# Patient Record
Sex: MALE | Race: WHITE | Employment: OTHER | URBAN - METROPOLITAN AREA
[De-identification: names, ages, dates, MRNs, and addresses within clinical notes are randomized per-mention and may not be internally consistent; named-entity substitution may affect disease eponyms.]

---

## 2020-08-28 VITALS
BODY MASS INDEX: 31.83 KG/M2 | RESPIRATION RATE: 18 BRPM | DIASTOLIC BLOOD PRESSURE: 88 MMHG | HEIGHT: 74 IN | WEIGHT: 248 LBS | SYSTOLIC BLOOD PRESSURE: 128 MMHG

## 2020-08-28 PROBLEM — R05.3 CHRONIC COUGH: Status: ACTIVE | Noted: 2017-07-07

## 2020-08-28 RX ORDER — WARFARIN SODIUM 5 MG/1
5 TABLET ORAL DAILY
COMMUNITY
End: 2021-02-01

## 2020-08-28 RX ORDER — MELOXICAM 15 MG/1
15 TABLET ORAL DAILY
COMMUNITY
End: 2020-12-26

## 2020-08-28 RX ORDER — HYDROCODONE BITARTRATE AND ACETAMINOPHEN 5; 325 MG/1; MG/1
TABLET ORAL
COMMUNITY
End: 2021-04-08 | Stop reason: ALTCHOICE

## 2020-08-28 RX ORDER — WARFARIN 7.5 MG/1
7.5 TABLET ORAL DAILY
COMMUNITY
End: 2020-09-30

## 2020-08-28 RX ORDER — PANTOPRAZOLE SODIUM 40 MG/1
40 TABLET, DELAYED RELEASE ORAL DAILY
COMMUNITY
End: 2021-07-20

## 2020-09-18 ENCOUNTER — TELEPHONE (OUTPATIENT)
Dept: INTERNAL MEDICINE CLINIC | Age: 65
End: 2020-09-18

## 2020-09-18 NOTE — TELEPHONE ENCOUNTER
Patient asks if you are still reviewing his pt/inr results. He says he has not heard from you in a while and wants to know if he should continue having them drawn.   Please call him at [phone]

## 2020-09-30 ENCOUNTER — HOSPITAL ENCOUNTER (OUTPATIENT)
Dept: LAB | Age: 65
Discharge: HOME | End: 2020-09-30
Payer: MEDICARE

## 2020-09-30 LAB
INR PPP: 2.2
PROTHROMBIN TIME: 19.2 SEC (ref 11.5–13.9)

## 2020-09-30 PROCEDURE — 85610 PROTHROMBIN TIME: CPT

## 2020-09-30 PROCEDURE — 36415 COLL VENOUS BLD VENIPUNCTURE: CPT

## 2020-09-30 RX ORDER — WARFARIN 7.5 MG/1
TABLET ORAL
Qty: 90 TAB | Refills: 0 | Status: SHIPPED | OUTPATIENT
Start: 2020-09-30 | End: 2021-02-01

## 2020-10-05 ENCOUNTER — HOSPITAL ENCOUNTER (OUTPATIENT)
Dept: LAB | Age: 65
Discharge: HOME | End: 2020-10-05
Payer: MEDICARE

## 2020-10-05 ENCOUNTER — OFFICE VISIT (OUTPATIENT)
Dept: INTERNAL MEDICINE CLINIC | Age: 65
End: 2020-10-05
Payer: MEDICARE

## 2020-10-05 ENCOUNTER — TRANSCRIBE ORDER (OUTPATIENT)
Dept: REGISTRATION | Age: 65
End: 2020-10-05

## 2020-10-05 DIAGNOSIS — E78.5 HYPERLIPIDEMIA, UNSPECIFIED HYPERLIPIDEMIA TYPE: ICD-10-CM

## 2020-10-05 DIAGNOSIS — I10 ESSENTIAL HYPERTENSION: ICD-10-CM

## 2020-10-05 DIAGNOSIS — Z13.6 SCREENING FOR ISCHEMIC HEART DISEASE: ICD-10-CM

## 2020-10-05 DIAGNOSIS — Z12.11 ENCOUNTER FOR SCREENING COLONOSCOPY: ICD-10-CM

## 2020-10-05 DIAGNOSIS — Z13.39 SCREENING FOR ALCOHOLISM: ICD-10-CM

## 2020-10-05 DIAGNOSIS — Z12.11 SCREEN FOR COLON CANCER: ICD-10-CM

## 2020-10-05 DIAGNOSIS — E78.5 HYPERLIPEMIA: ICD-10-CM

## 2020-10-05 DIAGNOSIS — I10 ESSENTIAL HYPERTENSION, MALIGNANT: ICD-10-CM

## 2020-10-05 DIAGNOSIS — I10 ESSENTIAL HYPERTENSION, MALIGNANT: Primary | ICD-10-CM

## 2020-10-05 DIAGNOSIS — Z13.1 SCREENING FOR DIABETES MELLITUS: ICD-10-CM

## 2020-10-05 DIAGNOSIS — Z13.31 SCREENING FOR DEPRESSION: ICD-10-CM

## 2020-10-05 DIAGNOSIS — Z00.00 WELCOME TO MEDICARE PREVENTIVE VISIT: Primary | ICD-10-CM

## 2020-10-05 LAB
ALBUMIN SERPL-MCNC: 4.3 G/DL (ref 3.5–4.7)
ALBUMIN/GLOB SERPL: 1.6 {RATIO}
ALP SERPL-CCNC: 64 U/L (ref 38–126)
ALT SERPL-CCNC: 30 U/L (ref 3–72)
ANION GAP SERPL CALC-SCNC: 9 MMOL/L
AST SERPL W P-5'-P-CCNC: 29 U/L (ref 17–74)
BILIRUB SERPL-MCNC: 0.3 MG/DL (ref 0.2–1)
BUN SERPL-MCNC: 17 MG/DL (ref 9–21)
BUN/CREAT SERPL: 17
CA-I BLD-MCNC: 9.2 MG/DL (ref 8.5–10.5)
CHLORIDE SERPL-SCNC: 104 MMOL/L (ref 94–111)
CHOLEST SERPL-MCNC: 202 MG/DL
CO2 SERPL-SCNC: 25 MMOL/L (ref 21–33)
CREAT SERPL-MCNC: 1 MG/DL (ref 0.8–1.5)
GLOBULIN SER CALC-MCNC: 2.7 G/DL
GLUCOSE SERPL-MCNC: 89 MG/DL (ref 70–110)
HDLC SERPL-MCNC: 41 MG/DL
HDLC SERPL: 4.9 {RATIO} (ref 0–5)
LDLC SERPL CALC-MCNC: 141.4 MG/DL (ref 0–100)
LIPID PROFILE,FLP: ABNORMAL
POTASSIUM SERPL-SCNC: 4 MMOL/L (ref 3.2–5.1)
PROT SERPL-MCNC: 7 G/DL (ref 6.1–8.4)
SODIUM SERPL-SCNC: 138 MMOL/L (ref 135–145)
TRIGL SERPL-MCNC: 98 MG/DL (ref ?–150)
VLDLC SERPL CALC-MCNC: 19.6 MG/DL

## 2020-10-05 PROCEDURE — 93000 ELECTROCARDIOGRAM COMPLETE: CPT | Performed by: INTERNAL MEDICINE

## 2020-10-05 PROCEDURE — G8417 CALC BMI ABV UP PARAM F/U: HCPCS | Performed by: INTERNAL MEDICINE

## 2020-10-05 PROCEDURE — G8427 DOCREV CUR MEDS BY ELIG CLIN: HCPCS | Performed by: INTERNAL MEDICINE

## 2020-10-05 PROCEDURE — G0402 INITIAL PREVENTIVE EXAM: HCPCS | Performed by: INTERNAL MEDICINE

## 2020-10-05 PROCEDURE — 80061 LIPID PANEL: CPT

## 2020-10-05 PROCEDURE — G8536 NO DOC ELDER MAL SCRN: HCPCS | Performed by: INTERNAL MEDICINE

## 2020-10-05 PROCEDURE — 36415 COLL VENOUS BLD VENIPUNCTURE: CPT

## 2020-10-05 PROCEDURE — 80053 COMPREHEN METABOLIC PANEL: CPT

## 2020-10-05 PROCEDURE — G8510 SCR DEP NEG, NO PLAN REQD: HCPCS | Performed by: INTERNAL MEDICINE

## 2020-10-05 RX ORDER — METOPROLOL SUCCINATE 50 MG/1
50 TABLET, EXTENDED RELEASE ORAL DAILY
Qty: 30 TAB | Refills: 5 | Status: SHIPPED | OUTPATIENT
Start: 2020-10-05 | End: 2020-10-26 | Stop reason: DRUGHIGH

## 2020-10-05 RX ORDER — LANOLIN ALCOHOL/MO/W.PET/CERES
1000 CREAM (GRAM) TOPICAL DAILY
COMMUNITY
End: 2021-11-02

## 2020-10-05 RX ORDER — LOSARTAN POTASSIUM AND HYDROCHLOROTHIAZIDE 12.5; 1 MG/1; MG/1
1 TABLET ORAL DAILY
Qty: 90 TAB | Refills: 3 | Status: SHIPPED | OUTPATIENT
Start: 2020-10-05 | End: 2021-11-02

## 2020-10-05 RX ORDER — ASPIRIN 81 MG/1
81 TABLET ORAL DAILY
COMMUNITY
End: 2022-11-02 | Stop reason: CLARIF

## 2020-10-05 NOTE — PROGRESS NOTES
Kathryn Dean. presents today for   Chief Complaint   Patient presents with    Annual Wellness Visit       Is someone accompanying this pt? no    Is the patient using any DME equipment during OV? no    Depression Screening:  3 most recent PHQ Screens 10/5/2020   Little interest or pleasure in doing things Not at all   Feeling down, depressed, irritable, or hopeless Not at all   Total Score PHQ 2 0       Learning Assessment:  Learning Assessment 10/5/2020   PRIMARY LEARNER Patient   HIGHEST LEVEL OF EDUCATION - PRIMARY LEARNER  GRADUATED HIGH SCHOOL OR GED   BARRIERS PRIMARY LEARNER NONE   CO-LEARNER CAREGIVER No   PRIMARY LANGUAGE ENGLISH   LEARNER PREFERENCE PRIMARY DEMONSTRATION   ANSWERED BY patient   RELATIONSHIP SELF       Abuse Screening:  Abuse Screening Questionnaire 10/5/2020   Do you ever feel afraid of your partner? N   Are you in a relationship with someone who physically or mentally threatens you? N   Is it safe for you to go home? Y       Fall Risk  Fall Risk Assessment, last 12 mths 10/5/2020   Able to walk? Yes   Fall in past 12 months? No       ADL  ADL Assessment 10/5/2020   Feeding yourself No Help Needed   Getting from bed to chair No Help Needed   Getting dressed No Help Needed   Bathing or showering No Help Needed   Walk across the room (includes cane/walker) No Help Needed   Using the telphone No Help Needed   Taking your medications No Help Needed   Preparing meals No Help Needed   Managing money (expenses/bills) No Help Needed   Moderately strenuous housework (laundry) No Help Needed   Shopping for personal items (toiletries/medicines) No Help Needed   Shopping for groceries No Help Needed   Driving No Help Needed   Climbing a flight of stairs No Help Needed   Getting to places beyond walking distances No Help Needed       Health Maintenance reviewed and discussed and ordered per Provider.     Health Maintenance Due   Topic Date Due    Hepatitis C Screening  1955    DTaP/Tdap/Td series (1 - Tdap) 08/27/1976    Lipid Screen  08/27/1995    Shingrix Vaccine Age 50> (1 of 2) 08/27/2005    FOBT Q1Y Age 54-65  08/27/2005    GLAUCOMA SCREENING Q2Y  08/27/2020    AAA Screening 73-69 YO Male Smoking Patients  08/27/2020    Pneumococcal 65+ years (1 of 1 - PPSV23) 08/27/2020    Flu Vaccine (1) 09/01/2020    Medicare Yearly Exam  09/30/2020   . Coordination of Care:  1. Have you been to the ER, urgent care clinic since your last visit? Hospitalized since your last visit? no    2. Have you seen or consulted any other health care providers outside of the [address] [place] [address] since your last visit? Include any pap smears or colon screening. no                    This is the Subsequent Medicare Annual Wellness Exam, performed 12 months or more after the Initial AWV or the last Subsequent AWV    I have reviewed the patient's medical history in detail and updated the computerized patient record. History     Patient Active Problem List   Diagnosis Code    Chronic cough R05    Deep venous thrombosis (HCC) I82.409    Essential hypertension I10    Increased frequency of urination R35.0    Primary hemochromatosis ([facility] [address] E83.110     Past Medical History:   Diagnosis Date    Arrhythmia     Hypertension       Past Surgical History:   Procedure Laterality Date    HX ORTHOPAEDIC      RIGHT ELBOW, RIGHT ROTATOR CUFF, RIGHT ARM, BILATERAL KNEE REPLACEMENT, 2 BACK SURGERIES    HX OTHER SURGICAL      SINUS X4     Current Outpatient Medications   Medication Sig Dispense Refill    aspirin delayed-release 81 mg tablet Take 81 mg by mouth daily.  loratadine/pseudoephedrine (CLARITIN-D 12 HOUR PO) Take  by mouth daily.  cyanocobalamin 1,000 mcg tablet Take 1,000 mcg by mouth daily.  warfarin (COUMADIN) 7.5 mg tablet TAKE 1 TABLET BY MOUTH DAILY 90 Tab 0    meloxicam (MOBIC) 15 mg tablet Take 15 mg by mouth daily.       pantoprazole (PROTONIX) 40 mg tablet Take 40 mg by mouth daily.  warfarin (COUMADIN) 5 mg tablet Take 5 mg by mouth daily.  hydroCHLOROthiazide (MICROZIDE) 12.5 mg capsule TAKE 1 CAPSULE BY MOUTH DAILY 90 Cap 1    losartan (COZAAR) 100 mg tablet TAKE 1 TABLET BY MOUTH DAILY 90 Tab 1    HYDROcodone-acetaminophen (NORCO) 5-325 mg per tablet Take  by mouth. No Known Allergies    No family history on file. Social History     Tobacco Use    Smoking status: Former Smoker     Packs/day: 0.50     Years: 20.00     Pack years: 10.00    Smokeless tobacco: Never Used   Substance Use Topics    Alcohol use: Not on file       Depression Risk Factor Screening:     3 most recent PHQ Screens 10/5/2020   Little interest or pleasure in doing things Not at all   Feeling down, depressed, irritable, or hopeless Not at all   Total Score PHQ 2 0       Alcohol Risk Screen   Do you average more than 1 drink per night or more than 7 drinks a week: no    In the past three months have you have had more than 4 drinks containing alcohol on one occasion: no        Functional Ability and Level of Safety:   Hearing hearing is good     Activities of Daily Living: The home contains: no safety equipment  Patient does total self care      Ambulation: with no difficulty      Fall Risk:  Fall Risk Assessment, last 12 mths 10/5/2020   Able to walk? Yes   Fall in past 12 months?  No     Abuse Screen:  Patient is not abused     Cognitive Screening   Has your family/caregiver stated any concerns about your memory: no     Normal     Patient Care Team   Patient Care Team:  David Peterson MD as PCP - General (Internal Medicine)  David Peterson MD as PCP - [facility] Empaneled Provider    Assessment/Plan   Education and counseling provided:  Appropriated based on age     Annual Medicare [address] Maintenance Due   Topic Date Due    Hepatitis C Screening  1955    DTaP/Tdap/Td series (1 - Tdap) 08/27/1976    Lipid Screen  08/27/1995    Shingrix Vaccine Age 50> (1 of 2) 08/27/2005    FOBT Q1Y Age 54-65  08/27/2005    GLAUCOMA SCREENING Q2Y  08/27/2020    AAA Screening 73-67 YO Male Smoking Patients  08/27/2020    Pneumococcal 65+ years (1 of 1 - PPSV23) 08/27/2020    Flu Vaccine (1) 09/01/2020    Medicare Yearly Exam  09/30/2020         This is a \"Welcome to [employer]"  Initial Preventive Physical Examination (IPPE) providing Personalized Prevention Plan Services (Performed in the first 12 months of enrollment)    I have reviewed the patient's medical history in detail and updated the computerized patient record. History     Past Medical History:   Diagnosis Date    Arrhythmia     Hypertension       Past Surgical History:   Procedure Laterality Date    HX ORTHOPAEDIC      RIGHT ELBOW, RIGHT ROTATOR CUFF, RIGHT ARM, BILATERAL KNEE REPLACEMENT, 2 BACK SURGERIES    HX OTHER SURGICAL      SINUS X4     Current Outpatient Medications   Medication Sig Dispense Refill    aspirin delayed-release 81 mg tablet Take 81 mg by mouth daily.  loratadine/pseudoephedrine (CLARITIN-D 12 HOUR PO) Take  by mouth daily.  cyanocobalamin 1,000 mcg tablet Take 1,000 mcg by mouth daily.  losartan-hydroCHLOROthiazide (HYZAAR) 100-12.5 mg per tablet Take 1 Tab by mouth daily. 90 Tab 3    metoprolol succinate (TOPROL-XL) 50 mg XL tablet Take 1 Tab by mouth daily. 30 Tab 5    warfarin (COUMADIN) 7.5 mg tablet TAKE 1 TABLET BY MOUTH DAILY 90 Tab 0    meloxicam (MOBIC) 15 mg tablet Take 15 mg by mouth daily.  pantoprazole (PROTONIX) 40 mg tablet Take 40 mg by mouth daily.  warfarin (COUMADIN) 5 mg tablet Take 5 mg by mouth daily.  hydroCHLOROthiazide (MICROZIDE) 12.5 mg capsule TAKE 1 CAPSULE BY MOUTH DAILY 90 Cap 1    losartan (COZAAR) 100 mg tablet TAKE 1 TABLET BY MOUTH DAILY 90 Tab 1    HYDROcodone-acetaminophen (NORCO) 5-325 mg per tablet Take  by mouth. No Known Allergies    No family history on file.   Social History     Tobacco Use    Smoking status: Former Smoker     Packs/day: 0.50     Years: 20.00     Pack years: 10.00    Smokeless tobacco: Never Used   Substance Use Topics    Alcohol use: Not on file       Depression Risk Screen     3 most recent PHQ Screens 10/6/2020   Little interest or pleasure in doing things Not at all   Feeling down, depressed, irritable, or hopeless Not at all   Total Score PHQ 2 0       Alcohol Risk Screen   Do you average more than 1 drink per night or more than 7 drinks a week: no    In the past three months have you have had more than 4 drinks containing alcohol on one occasion: no          Functional Ability and Level of Safety   Diet: no special diet    Hearing: hearing is good     Vision Screening:  Vision is good  No exam data present     Activities of Daily Living: The home contains:no safety equipment       Ambulation : without difficulty     Exercise level: moderately active      Fall Risk Screen:  Fall Risk Assessment, last 12 mths 10/6/2020   Able to walk? Yes   Fall in past 12 months? No     Abuse Screen:  Patient is not abused        Screening EKG   EKG order placed: yes    Patient Care Team   Patient Care Team:  Derrick Peterson MD as PCP - General (Internal Medicine)  Derrick Peterson MD as PCP - [facility]     End of Life Planning   Was not discussed     Assessment/Plan   Education and counseling provided:  Appropriate based on age     Diagnoses and all orders for this visit:    1. Welcome to Medicare preventive visit  -     AMB POC EKG ROUTINE W/ 12 LEADS, INTER & REP  -     AMB POC EKG ROUTINE W/ 12 LEADS, SCREEN ()    2. Encounter for screening colonoscopy  -     REFERRAL TO GASTROENTEROLOGY    3. Essential hypertension  -     METABOLIC PANEL, COMPREHENSIVE; Future  -     LIPID PANEL; Future  -     losartan-hydroCHLOROthiazide (HYZAAR) 100-12.5 mg per tablet; Take 1 Tab by mouth daily. 4. Hyperlipidemia, unspecified hyperlipidemia type  -     METABOLIC PANEL, COMPREHENSIVE;  Future  - LIPID PANEL; Future    5. Screening for alcoholism    6. Screening for depression  -     DEPRESSION SCREEN ANNUAL    7. Screen for colon cancer  -     REFERRAL FOR COLONOSCOPY    8. Screening for diabetes mellitus    9. Screening for ischemic heart disease    Other orders  -     metoprolol succinate (TOPROL-XL) 50 mg XL tablet; Take 1 Tab by mouth daily.         Health Maintenance Due   Topic Date Due    Hepatitis C Screening  1955    DTaP/Tdap/Td series (1 - Tdap) 08/27/1976    Shingrix Vaccine Age 50> (1 of 2) 08/27/2005    FOBT Q1Y Age 54-65  08/27/2005    GLAUCOMA SCREENING Q2Y  08/27/2020    AAA Screening 73-69 YO Male Smoking Patients  08/27/2020    Pneumococcal 65+ years (1 of 1 - PPSV23) 08/27/2020    Flu Vaccine (1) 09/01/2020

## 2020-10-05 NOTE — PROGRESS NOTES
Brynn Tomas. is a 72 y.o. male presenting for a welcome to Medicare. Chief Complaint   Patient presents with    Annual Wellness Visit        HPI presenting today for welcome to Medicare evaluation. 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 With regard to vaccinations he has just turned 65 and so we recommended the Prevnar 13. At the same time he may as well go ahead and get the flu vaccine for this year. He has never had the shingles vaccine and so we discussed that at length and I did recommend that he get that as well. Recently he has had some trouble with his feet swelling and he has had 2 retinal holes in his eye and has being seeing Dr. Aaron García has been following that. He is not sure about his colonoscopy and so I looked it up and as it turns out he had a polyp in 2013 and they told him he needed a 5-year follow-up so he is due for a colonoscopy we will make a referral.  He has been seeing Dr. Ayden Irwin for his hemochromatosis and she has been checking CBCs lately but we need to check a metabolic panel and lipid on him. He asks me whether or not he could take another medication for his anticoagulation that he would not have to keep getting this blood work. I told him that Eliquis was available as well as Xarelto but I was not sure for the price differential and so forth what was best there is if he can possibly go back on his losartan HCT so he will not have to keep taking 2 pills instead of 1. Past Medical History:   Diagnosis Date    Arrhythmia     Hypertension         Current Outpatient Medications on File Prior to Visit   Medication Sig Dispense Refill    aspirin delayed-release 81 mg tablet Take 81 mg by mouth daily.  loratadine/pseudoephedrine (CLARITIN-D 12 HOUR PO) Take  by mouth daily.       cyanocobalamin 1,000 mcg tablet Take 1,000 mcg by mouth daily.  warfarin (COUMADIN) 7.5 mg tablet TAKE 1 TABLET BY MOUTH DAILY 90 Tab 0    meloxicam (MOBIC) 15 mg tablet Take 15 mg by mouth daily.  pantoprazole (PROTONIX) 40 mg tablet Take 40 mg by mouth daily.  warfarin (COUMADIN) 5 mg tablet Take 5 mg by mouth daily.  hydroCHLOROthiazide (MICROZIDE) 12.5 mg capsule TAKE 1 CAPSULE BY MOUTH DAILY 90 Cap 1    losartan (COZAAR) 100 mg tablet TAKE 1 TABLET BY MOUTH DAILY 90 Tab 1    HYDROcodone-acetaminophen (NORCO) 5-325 mg per tablet Take  by mouth. No current facility-administered medications on file prior to visit. ROS as noted in the history of present illness    Visit Vitals  BP (!) 158/98 (BP 1 Location: Left arm, BP Patient Position: Sitting)   Pulse 84   Temp 97.8 °F (36.6 °C)   Resp 18   Wt 246 lb (111.6 kg)   BMI 31.58 kg/m²        Physical Exam well-developed  male alert oriented cooperative no acute distress normocephalic conjunctiva pink sclera anicteric ears look okay nose and oropharynx not checked because of mask neck no lymphadenopathy mass thyromegaly or bruit lungs bilaterally clear to auscultation heart regular rhythm no murmurs gallops rubs or extrasystoles abdomen normal bowel sounds soft and nontender no mass organomegaly no rebound or peritoneal irritation no hepatosplenomegaly extremities are without clubbing cyanosis or edema dorsalis pedis and posterior tibial pulses are equal.    Assessment & Plan we need to check his blood work as noted we will make a referral to Dr. Jameson Brady for repeat colonoscopy and we recommended vaccines as noted above. I would like to see him back after we put him on some metoprolol succinate 50 mg daily to try and get his blood pressure down.

## 2020-10-05 NOTE — PATIENT INSTRUCTIONS
Medicare Wellness Visit, Male    The best way to live healthy is to have a lifestyle where you eat a well-balanced diet, exercise regularly, limit alcohol use, and quit all forms of tobacco/nicotine, if applicable. Regular preventive services are another way to keep healthy. Preventive services (vaccines, screening tests, monitoring & exams) can help personalize your care plan, which helps you manage your own care. Screening tests can find health problems at the earliest stages, when they are easiest to treat. [facility] follows the current, evidence-based guidelines published by the [facility] ([facility]) when recommending preventive services for our patients. Because we follow these guidelines, sometimes recommendations change over time as research supports it. (For example, a prostate screening blood test is no longer routinely recommended for men with no symptoms). Of course, you and your doctor may decide to screen more often for some diseases, based on your risk and co-morbidities (chronic disease you are already diagnosed with). Preventive services for you include:  - Medicare offers their members a free annual wellness visit, which is time for you and your primary care provider to discuss and plan for your preventive service needs. Take advantage of this benefit every year!  -All adults over age 72 should receive the recommended pneumonia vaccines. Current USPSTF guidelines recommend a series of two vaccines for the best pneumonia protection.   -All adults should have a flu vaccine yearly and tetanus vaccine every 10 years.  -All adults age 48 and older should receive the shingles vaccines (series of two vaccines).        -All adults age 38-68 who are overweight should have a diabetes screening test once every three years.   -Other screening tests & preventive services for persons with diabetes include: an eye exam to screen for diabetic retinopathy, a kidney function test, a foot exam, and stricter control over your cholesterol.   -Cardiovascular screening for adults with routine risk involves an electrocardiogram (ECG) at intervals determined by the provider.   -Colorectal cancer screening should be done for adults age 54-65 with no increased risk factors for colorectal cancer. There are a number of acceptable methods of screening for this type of cancer. Each test has its own benefits and drawbacks. Discuss with your provider what is most appropriate for you during your annual wellness visit. The different tests include: colonoscopy (considered the best screening method), a fecal occult blood test, a fecal DNA test, and sigmoidoscopy.  -All adults born between [facility] should be screened once for Hepatitis C.  -An Abdominal Aortic Aneurysm (AAA) Screening is recommended for men age 73-68 who has ever smoked in their lifetime. Here is a list of your current Health Maintenance items (your personalized list of preventive services) with a due date:  Health Maintenance Due   Topic Date Due    Hepatitis C Test  1955    DTaP/Tdap/Td  (1 - Tdap) 08/27/1976    Cholesterol Test   08/27/1995    Shingles Vaccine (1 of 2) 08/27/2005    Colon Cancer Stool Test  08/27/2005    Glaucoma Screening   08/27/2020    AAA Screening  08/27/2020    Pneumococcal Vaccine (1 of 1 - PPSV23) 08/27/2020    Yearly Flu Vaccine (1) 09/01/2020    Annual Well Visit  09/30/2020     Medicare Wellness Visit, Male    The best way to live healthy is to have a lifestyle where you eat a well-balanced diet, exercise regularly, limit alcohol use, and quit all forms of tobacco/nicotine, if applicable. Regular preventive services are another way to keep healthy. Preventive services (vaccines, screening tests, monitoring & exams) can help personalize your care plan, which helps you manage your own care.  Screening tests can find health problems at the earliest stages, when they are easiest to treat. [facility] follows the current, evidence-based guidelines published by the [facility] [place] ([facility]) when recommending preventive services for our patients. Because we follow these guidelines, sometimes recommendations change over time as research supports it. (For example, a prostate screening blood test is no longer routinely recommended for men with no symptoms). Of course, you and your doctor may decide to screen more often for some diseases, based on your risk and co-morbidities (chronic disease you are already diagnosed with). Preventive services for you include:  - Medicare offers their members a free annual wellness visit, which is time for you and your primary care provider to discuss and plan for your preventive service needs. Take advantage of this benefit every year!  -All adults over age 72 should receive the recommended pneumonia vaccines. Current USPSTF guidelines recommend a series of two vaccines for the best pneumonia protection.   -All adults should have a flu vaccine yearly and tetanus vaccine every 10 years.  -All adults age 48 and older should receive the shingles vaccines (series of two vaccines). -All adults age 38-68 who are overweight should have a diabetes screening test once every three years.   -Other screening tests & preventive services for persons with diabetes include: an eye exam to screen for diabetic retinopathy, a kidney function test, a foot exam, and stricter control over your cholesterol.   -Cardiovascular screening for adults with routine risk involves an electrocardiogram (ECG) at intervals determined by the provider.   -Colorectal cancer screening should be done for adults age 54-65 with no increased risk factors for colorectal cancer. There are a number of acceptable methods of screening for this type of cancer. Each test has its own benefits and drawbacks.  Discuss with your provider what is most appropriate for you during your annual wellness visit. The different tests include: colonoscopy (considered the best screening method), a fecal occult blood test, a fecal DNA test, and sigmoidoscopy.  -All adults born between [facility] should be screened once for Hepatitis C.  -An Abdominal Aortic Aneurysm (AAA) Screening is recommended for men age 73-68 who has ever smoked in their lifetime. Here is a list of your current Health Maintenance items (your personalized list of preventive services) with a due date:  Health Maintenance Due   Topic Date Due    Hepatitis C Test  1955    DTaP/Tdap/Td  (1 - Tdap) 08/27/1976    Shingles Vaccine (1 of 2) 08/27/2005    Colon Cancer Stool Test  08/27/2005    Glaucoma Screening   08/27/2020    AAA Screening  08/27/2020    Pneumococcal Vaccine (1 of 1 - PPSV23) 08/27/2020    Yearly Flu Vaccine (1) 09/01/2020     Medicare Wellness Visit, Male    The best way to live healthy is to have a lifestyle where you eat a well-balanced diet, exercise regularly, limit alcohol use, and quit all forms of tobacco/nicotine, if applicable. Regular preventive services are another way to keep healthy. Preventive services (vaccines, screening tests, monitoring & exams) can help personalize your care plan, which helps you manage your own care. Screening tests can find health problems at the earliest stages, when they are easiest to treat. [facility] follows the current, evidence-based guidelines published by the [facility] [place] (USPSTF) when recommending preventive services for our patients. Because we follow these guidelines, sometimes recommendations change over time as research supports it. (For example, a prostate screening blood test is no longer routinely recommended for men with no symptoms).   Of course, you and your doctor may decide to screen more often for some diseases, based on your risk and co-morbidities (chronic disease you are already diagnosed with). Preventive services for you include:  - Medicare offers their members a free annual wellness visit, which is time for you and your primary care provider to discuss and plan for your preventive service needs. Take advantage of this benefit every year!  -All adults over age 72 should receive the recommended pneumonia vaccines. Current USPSTF guidelines recommend a series of two vaccines for the best pneumonia protection.   -All adults should have a flu vaccine yearly and tetanus vaccine every 10 years.  -All adults age 48 and older should receive the shingles vaccines (series of two vaccines). -All adults age 38-68 who are overweight should have a diabetes screening test once every three years.   -Other screening tests & preventive services for persons with diabetes include: an eye exam to screen for diabetic retinopathy, a kidney function test, a foot exam, and stricter control over your cholesterol.   -Cardiovascular screening for adults with routine risk involves an electrocardiogram (ECG) at intervals determined by the provider.   -Colorectal cancer screening should be done for adults age 54-65 with no increased risk factors for colorectal cancer. There are a number of acceptable methods of screening for this type of cancer. Each test has its own benefits and drawbacks. Discuss with your provider what is most appropriate for you during your annual wellness visit. The different tests include: colonoscopy (considered the best screening method), a fecal occult blood test, a fecal DNA test, and sigmoidoscopy.  -All adults born between [facility] should be screened once for Hepatitis C.  -An Abdominal Aortic Aneurysm (AAA) Screening is recommended for men age 73-68 who has ever smoked in their lifetime.      Here is a list of your current Health Maintenance items (your personalized list of preventive services) with a due date:  Health Maintenance Due   Topic Date Due    Hepatitis C Test  1955    DTaP/Tdap/Td  (1 - Tdap) 08/27/1976    Shingles Vaccine (1 of 2) 08/27/2005    Colon Cancer Stool Test  08/27/2005    Glaucoma Screening   08/27/2020    AAA Screening  08/27/2020    Pneumococcal Vaccine (1 of 1 - PPSV23) 08/27/2020    Yearly Flu Vaccine (1) 09/01/2020     Medicare Wellness Visit, Male    The best way to live healthy is to have a lifestyle where you eat a well-balanced diet, exercise regularly, limit alcohol use, and quit all forms of tobacco/nicotine, if applicable. Regular preventive services are another way to keep healthy. Preventive services (vaccines, screening tests, monitoring & exams) can help personalize your care plan, which helps you manage your own care. Screening tests can find health problems at the earliest stages, when they are easiest to treat. [facility] follows the current, evidence-based guidelines published by the [facility] [place] ([facility]) when recommending preventive services for our patients. Because we follow these guidelines, sometimes recommendations change over time as research supports it. (For example, a prostate screening blood test is no longer routinely recommended for men with no symptoms). Of course, you and your doctor may decide to screen more often for some diseases, based on your risk and co-morbidities (chronic disease you are already diagnosed with). Preventive services for you include:  - Medicare offers their members a free annual wellness visit, which is time for you and your primary care provider to discuss and plan for your preventive service needs. Take advantage of this benefit every year!  -All adults over age 72 should receive the recommended pneumonia vaccines.  Current USPSTF guidelines recommend a series of two vaccines for the best pneumonia protection.   -All adults should have a flu vaccine yearly and tetanus vaccine every 10 years.  -All adults age 48 and older should receive the shingles vaccines (series of two vaccines). -All adults age 38-68 who are overweight should have a diabetes screening test once every three years.   -Other screening tests & preventive services for persons with diabetes include: an eye exam to screen for diabetic retinopathy, a kidney function test, a foot exam, and stricter control over your cholesterol.   -Cardiovascular screening for adults with routine risk involves an electrocardiogram (ECG) at intervals determined by the provider.   -Colorectal cancer screening should be done for adults age 54-65 with no increased risk factors for colorectal cancer. There are a number of acceptable methods of screening for this type of cancer. Each test has its own benefits and drawbacks. Discuss with your provider what is most appropriate for you during your annual wellness visit. The different tests include: colonoscopy (considered the best screening method), a fecal occult blood test, a fecal DNA test, and sigmoidoscopy.  -All adults born between [facility] should be screened once for Hepatitis C.  -An Abdominal Aortic Aneurysm (AAA) Screening is recommended for men age 73-68 who has ever smoked in their lifetime.      Here is a list of your current Health Maintenance items (your personalized list of preventive services) with a due date:  Health Maintenance Due   Topic Date Due    Hepatitis C Test  1955    DTaP/Tdap/Td  (1 - Tdap) 08/27/1976    Shingles Vaccine (1 of 2) 08/27/2005    Colon Cancer Stool Test  08/27/2005    Glaucoma Screening   08/27/2020    AAA Screening  08/27/2020    Pneumococcal Vaccine (1 of 1 - PPSV23) 08/27/2020    Yearly Flu Vaccine (1) 09/01/2020

## 2020-10-06 VITALS
HEART RATE: 84 BPM | HEIGHT: 74 IN | WEIGHT: 244 LBS | RESPIRATION RATE: 18 BRPM | TEMPERATURE: 97.3 F | BODY MASS INDEX: 31.32 KG/M2 | DIASTOLIC BLOOD PRESSURE: 98 MMHG | SYSTOLIC BLOOD PRESSURE: 158 MMHG

## 2020-10-26 ENCOUNTER — OFFICE VISIT (OUTPATIENT)
Dept: INTERNAL MEDICINE CLINIC | Age: 65
End: 2020-10-26
Payer: MEDICARE

## 2020-10-26 VITALS
RESPIRATION RATE: 20 BRPM | BODY MASS INDEX: 31.21 KG/M2 | TEMPERATURE: 97.9 F | SYSTOLIC BLOOD PRESSURE: 140 MMHG | DIASTOLIC BLOOD PRESSURE: 84 MMHG | HEART RATE: 68 BPM | WEIGHT: 243.2 LBS | HEIGHT: 74 IN

## 2020-10-26 DIAGNOSIS — I10 ESSENTIAL HYPERTENSION: Primary | ICD-10-CM

## 2020-10-26 DIAGNOSIS — E83.110 PRIMARY HEMOCHROMATOSIS (HCC): ICD-10-CM

## 2020-10-26 PROCEDURE — 99213 OFFICE O/P EST LOW 20 MIN: CPT | Performed by: INTERNAL MEDICINE

## 2020-10-26 RX ORDER — METOPROLOL SUCCINATE 100 MG/1
100 TABLET, EXTENDED RELEASE ORAL DAILY
Qty: 30 TAB | Refills: 5 | Status: SHIPPED | OUTPATIENT
Start: 2020-10-26 | End: 2020-11-25

## 2020-10-26 NOTE — PROGRESS NOTES
Winnie Mooney is a 72 y.o. male presenting for           Chief Complaint   Patient presents with    Medication Evaluation    Hypertension        HPI for follow-up of his hypertension. He is taking losartan /12.5 and metoprolol succinate 50 mg daily. He is doing well and asymptomatic. He is adjusting to long-term and enjoying it very well staying active. Imitations to his activities although he knows if he does too much his back will tell him about it the next day. Past Medical History:   Diagnosis Date    Arrhythmia     Hypertension         Current Outpatient Medications on File Prior to Visit   Medication Sig Dispense Refill    aspirin delayed-release 81 mg tablet Take 81 mg by mouth daily.  loratadine/pseudoephedrine (CLARITIN-D 12 HOUR PO) Take  by mouth daily.  cyanocobalamin 1,000 mcg tablet Take 1,000 mcg by mouth daily.  losartan-hydroCHLOROthiazide (HYZAAR) 100-12.5 mg per tablet Take 1 Tab by mouth daily. 90 Tab 3    metoprolol succinate (TOPROL-XL) 50 mg XL tablet Take 1 Tab by mouth daily. 30 Tab 5    warfarin (COUMADIN) 7.5 mg tablet TAKE 1 TABLET BY MOUTH DAILY 90 Tab 0    meloxicam (MOBIC) 15 mg tablet Take 15 mg by mouth daily.  pantoprazole (PROTONIX) 40 mg tablet Take 40 mg by mouth daily.  warfarin (COUMADIN) 5 mg tablet Take 5 mg by mouth daily.  HYDROcodone-acetaminophen (NORCO) 5-325 mg per tablet Take  by mouth.  hydroCHLOROthiazide (MICROZIDE) 12.5 mg capsule TAKE 1 CAPSULE BY MOUTH DAILY 90 Cap 1    losartan (COZAAR) 100 mg tablet TAKE 1 TABLET BY MOUTH DAILY 90 Tab 1     No current facility-administered medications on file prior to visit.          ROS all systems are reviewed and negative  Visit Vitals  BP (!) 140/84 (BP 1 Location: Left arm, BP Patient Position: At rest)   Pulse 68 Comment: Regular   Temp 97.9 °F (36.6 °C)   Resp 20   Ht 6' 2\" (1.88 m)   Wt 243 lb 3.2 oz (110.3 kg)   BMI 31.23 kg/m²        Physical Exam  male alert and oriented no acute distress normocephalic conjunctiva pink sclera anicteric oral mucosa because of mask neck supple bilaterally clear to auscultation heart regular rhythm no gallops or extrasystoles and is soft extremities benign    Assessment & Plan Eliu Luis has not heard from Dr. Keturah Brunner group about a repeat colonoscopy and we will look into that for him.   I am increasing his metoprolol succinate to 100 mg daily and I will see him back routinely

## 2020-11-02 ENCOUNTER — HOSPITAL ENCOUNTER (OUTPATIENT)
Dept: LAB | Age: 65
Discharge: HOME | End: 2020-11-02
Payer: MEDICARE

## 2020-11-02 ENCOUNTER — TRANSCRIBE ORDER (OUTPATIENT)
Dept: REGISTRATION | Age: 65
End: 2020-11-02

## 2020-11-02 DIAGNOSIS — O22.30 DEEP PHLEBOTHROMBOSIS, ANTEPARTUM, WITH DELIVERY (HCC): ICD-10-CM

## 2020-11-02 DIAGNOSIS — O22.30 DEEP PHLEBOTHROMBOSIS, ANTEPARTUM, WITH DELIVERY (HCC): Primary | ICD-10-CM

## 2020-11-02 DIAGNOSIS — I82.409 DEEP PHLEBOTHROMBOSIS, ANTEPARTUM, WITH DELIVERY (HCC): Primary | ICD-10-CM

## 2020-11-02 DIAGNOSIS — I82.409 DEEP PHLEBOTHROMBOSIS, ANTEPARTUM, WITH DELIVERY (HCC): ICD-10-CM

## 2020-11-02 LAB
INR PPP: 2.5 (ref 0.8–1.2)
PROTHROMBIN TIME: 26.6 SEC (ref 11.5–15.2)

## 2020-11-02 PROCEDURE — 36415 COLL VENOUS BLD VENIPUNCTURE: CPT

## 2020-11-02 PROCEDURE — 85610 PROTHROMBIN TIME: CPT

## 2020-11-11 RX ORDER — HYDROCHLOROTHIAZIDE 12.5 MG/1
CAPSULE ORAL
Qty: 90 CAP | Refills: 1 | Status: SHIPPED | OUTPATIENT
Start: 2020-11-11 | End: 2021-08-09

## 2020-11-11 RX ORDER — LOSARTAN POTASSIUM 100 MG/1
TABLET ORAL
Qty: 90 TAB | Refills: 1 | Status: SHIPPED | OUTPATIENT
Start: 2020-11-11 | End: 2021-08-09

## 2020-12-07 ENCOUNTER — HOSPITAL ENCOUNTER (OUTPATIENT)
Dept: LAB | Age: 65
Discharge: HOME | End: 2020-12-07
Payer: MEDICARE

## 2020-12-07 ENCOUNTER — TRANSCRIBE ORDER (OUTPATIENT)
Dept: REGISTRATION | Age: 65
End: 2020-12-07

## 2020-12-07 DIAGNOSIS — O22.30 DEEP PHLEBOTHROMBOSIS, ANTEPARTUM, WITH DELIVERY (HCC): ICD-10-CM

## 2020-12-07 DIAGNOSIS — O22.30 DEEP PHLEBOTHROMBOSIS, ANTEPARTUM, WITH DELIVERY (HCC): Primary | ICD-10-CM

## 2020-12-07 DIAGNOSIS — I82.409 DEEP PHLEBOTHROMBOSIS, ANTEPARTUM, WITH DELIVERY (HCC): ICD-10-CM

## 2020-12-07 DIAGNOSIS — I82.409 DEEP PHLEBOTHROMBOSIS, ANTEPARTUM, WITH DELIVERY (HCC): Primary | ICD-10-CM

## 2020-12-07 LAB
INR PPP: 2.5 (ref 0.8–1.2)
PROTHROMBIN TIME: 26.5 SEC (ref 11.5–15.2)

## 2020-12-07 PROCEDURE — 36415 COLL VENOUS BLD VENIPUNCTURE: CPT

## 2020-12-07 PROCEDURE — 85610 PROTHROMBIN TIME: CPT

## 2020-12-23 ENCOUNTER — HOSPITAL ENCOUNTER (OUTPATIENT)
Dept: LAB | Age: 65
Discharge: HOME | End: 2020-12-23
Payer: MEDICARE

## 2020-12-23 ENCOUNTER — OFFICE VISIT (OUTPATIENT)
Dept: INTERNAL MEDICINE CLINIC | Age: 65
End: 2020-12-23
Payer: MEDICARE

## 2020-12-23 VITALS — TEMPERATURE: 97.7 F | SYSTOLIC BLOOD PRESSURE: 140 MMHG | DIASTOLIC BLOOD PRESSURE: 80 MMHG

## 2020-12-23 DIAGNOSIS — R10.9 LEFT FLANK PAIN: ICD-10-CM

## 2020-12-23 DIAGNOSIS — R10.9 LEFT FLANK PAIN: Primary | ICD-10-CM

## 2020-12-23 LAB
APPEARANCE UR: CLEAR
BACTERIA URNS QL MICRO: ABNORMAL /HPF
BILIRUB UR QL: NEGATIVE
COLOR UR: ABNORMAL
GLUCOSE UR STRIP.AUTO-MCNC: NEGATIVE MG/DL
HGB UR QL STRIP: ABNORMAL
KETONES UR QL STRIP.AUTO: NEGATIVE MG/DL
LEUKOCYTE ESTERASE UR QL STRIP.AUTO: NEGATIVE
MUCOUS THREADS URNS QL MICRO: ABNORMAL /LPF
NITRITE UR QL STRIP.AUTO: NEGATIVE
PH UR STRIP: 5 [PH] (ref 5–9)
PROT UR STRIP-MCNC: NEGATIVE MG/DL
RBC #/AREA URNS HPF: ABNORMAL /HPF (ref 0–2)
SP GR UR REFRACTOMETRY: 1.01 (ref 1–1.03)
UROBILINOGEN UR QL STRIP.AUTO: 0.2 EU/DL (ref 0.2–1)
WBC URNS QL MICRO: ABNORMAL /HPF (ref 0–4)

## 2020-12-23 PROCEDURE — 99213 OFFICE O/P EST LOW 20 MIN: CPT | Performed by: INTERNAL MEDICINE

## 2020-12-23 PROCEDURE — 3017F COLORECTAL CA SCREEN DOC REV: CPT | Performed by: INTERNAL MEDICINE

## 2020-12-23 PROCEDURE — G8510 SCR DEP NEG, NO PLAN REQD: HCPCS | Performed by: INTERNAL MEDICINE

## 2020-12-23 PROCEDURE — G8427 DOCREV CUR MEDS BY ELIG CLIN: HCPCS | Performed by: INTERNAL MEDICINE

## 2020-12-23 PROCEDURE — G8536 NO DOC ELDER MAL SCRN: HCPCS | Performed by: INTERNAL MEDICINE

## 2020-12-23 PROCEDURE — 1101F PT FALLS ASSESS-DOCD LE1/YR: CPT | Performed by: INTERNAL MEDICINE

## 2020-12-23 PROCEDURE — 87086 URINE CULTURE/COLONY COUNT: CPT

## 2020-12-23 PROCEDURE — 81001 URINALYSIS AUTO W/SCOPE: CPT

## 2020-12-23 PROCEDURE — G8417 CALC BMI ABV UP PARAM F/U: HCPCS | Performed by: INTERNAL MEDICINE

## 2020-12-23 RX ORDER — CYCLOBENZAPRINE HCL 10 MG
TABLET ORAL
Qty: 30 TAB | Refills: 2 | Status: SHIPPED | OUTPATIENT
Start: 2020-12-23 | End: 2021-11-02

## 2020-12-23 NOTE — PROGRESS NOTES
Le Starkey. is a 72 y.o. male presenting for left-sided back pain. Chief Complaint   Patient presents with    Back Pain        HPI Antonino experienced the abrupt onset of left-sided back pain yesterday. It more or less started when he was getting up out of his chair. He had severe pain and had to sit back down and could not really actually get up. Did not have any real radiation of the pain but certain positions would set it off. He was there again this morning and he took 2 Tylenol and now he is feeling much better. He recalls no injury or trauma. Did nothing unusual yesterday he has had no falls or injuries. He was a little worried about his urinary tract and kidney stones but he is not sure why. Past Medical History:   Diagnosis Date    Arrhythmia     Hypertension         Current Outpatient Medications on File Prior to Visit   Medication Sig Dispense Refill    losartan (COZAAR) 100 mg tablet TAKE 1 TABLET BY MOUTH DAILY 90 Tab 1    hydroCHLOROthiazide (MICROZIDE) 12.5 mg capsule TAKE 1 CAPSULE BY MOUTH DAILY 90 Cap 1    aspirin delayed-release 81 mg tablet Take 81 mg by mouth daily.  loratadine/pseudoephedrine (CLARITIN-D 12 HOUR PO) Take  by mouth daily.  cyanocobalamin 1,000 mcg tablet Take 1,000 mcg by mouth daily.  warfarin (COUMADIN) 7.5 mg tablet TAKE 1 TABLET BY MOUTH DAILY 90 Tab 0    meloxicam (MOBIC) 15 mg tablet Take 15 mg by mouth daily.  pantoprazole (PROTONIX) 40 mg tablet Take 40 mg by mouth daily.  warfarin (COUMADIN) 5 mg tablet Take 5 mg by mouth daily.  losartan-hydroCHLOROthiazide (HYZAAR) 100-12.5 mg per tablet Take 1 Tab by mouth daily. 90 Tab 3    HYDROcodone-acetaminophen (NORCO) 5-325 mg per tablet Take  by mouth. No current facility-administered medications on file prior to visit.          ROS as noted in the history of present illness    Visit Vitals  BP (!) 140/80 (BP 1 Location: Right arm, BP Patient Position: Sitting) Temp 97.7 °F (36.5 °C)        Physical Exam well  man alert oriented cooperative no acute distress normocephalic conjunctiva pink sclera anicteric oral mucosa examined neck supple no lymphadenopathy lungs are clear he has some minimal tenderness in his paraspinous musculature up and down the left side of his thoracic spine and lumbar spine. He has no neurological changes and straight leg raise is negative. Assessment & Plan think this is primarily musculoskeletal pain and I am going to let him have a little bit of Flexeril to take on a as needed basis. I encouraged him to use the Tylenol 3 times a day.   For completeness sake I am going to check a urine and a culture and he will call us if he has problems No

## 2020-12-24 LAB
BACTERIA SPEC CULT: NORMAL
SPECIAL REQUESTS,SREQ: NORMAL

## 2020-12-26 RX ORDER — MELOXICAM 15 MG/1
TABLET ORAL
Qty: 90 TAB | Refills: 3 | Status: SHIPPED | OUTPATIENT
Start: 2020-12-26 | End: 2021-12-17 | Stop reason: SDUPTHER

## 2021-01-07 ENCOUNTER — HOSPITAL ENCOUNTER (OUTPATIENT)
Dept: LAB | Age: 66
Discharge: HOME | End: 2021-01-07
Payer: MEDICARE

## 2021-01-07 ENCOUNTER — TRANSCRIBE ORDER (OUTPATIENT)
Dept: REGISTRATION | Age: 66
End: 2021-01-07

## 2021-01-07 ENCOUNTER — OFFICE VISIT (OUTPATIENT)
Dept: INTERNAL MEDICINE CLINIC | Age: 66
End: 2021-01-07
Payer: MEDICARE

## 2021-01-07 VITALS — HEIGHT: 74 IN | BODY MASS INDEX: 31.8 KG/M2 | RESPIRATION RATE: 20 BRPM | WEIGHT: 247.8 LBS | TEMPERATURE: 98.1 F

## 2021-01-07 DIAGNOSIS — I82.409 DEEP PHLEBOTHROMBOSIS, ANTEPARTUM, WITH DELIVERY (HCC): ICD-10-CM

## 2021-01-07 DIAGNOSIS — I10 ESSENTIAL HYPERTENSION: Primary | ICD-10-CM

## 2021-01-07 DIAGNOSIS — O22.30 DEEP PHLEBOTHROMBOSIS, ANTEPARTUM, WITH DELIVERY (HCC): ICD-10-CM

## 2021-01-07 DIAGNOSIS — I82.409 DEEP PHLEBOTHROMBOSIS, ANTEPARTUM, WITH DELIVERY (HCC): Primary | ICD-10-CM

## 2021-01-07 DIAGNOSIS — O22.30 DEEP PHLEBOTHROMBOSIS, ANTEPARTUM, WITH DELIVERY (HCC): Primary | ICD-10-CM

## 2021-01-07 LAB
INR PPP: 2.6 (ref 0.8–1.2)
PROTHROMBIN TIME: 27.6 SEC (ref 11.5–15.2)

## 2021-01-07 PROCEDURE — 99213 OFFICE O/P EST LOW 20 MIN: CPT | Performed by: INTERNAL MEDICINE

## 2021-01-07 PROCEDURE — 85610 PROTHROMBIN TIME: CPT

## 2021-01-07 PROCEDURE — G8756 NO BP MEASURE DOC: HCPCS | Performed by: INTERNAL MEDICINE

## 2021-01-07 PROCEDURE — 36415 COLL VENOUS BLD VENIPUNCTURE: CPT

## 2021-01-07 PROCEDURE — G8417 CALC BMI ABV UP PARAM F/U: HCPCS | Performed by: INTERNAL MEDICINE

## 2021-01-07 PROCEDURE — 1101F PT FALLS ASSESS-DOCD LE1/YR: CPT | Performed by: INTERNAL MEDICINE

## 2021-01-07 PROCEDURE — G8536 NO DOC ELDER MAL SCRN: HCPCS | Performed by: INTERNAL MEDICINE

## 2021-01-07 PROCEDURE — G8427 DOCREV CUR MEDS BY ELIG CLIN: HCPCS | Performed by: INTERNAL MEDICINE

## 2021-01-07 PROCEDURE — 3017F COLORECTAL CA SCREEN DOC REV: CPT | Performed by: INTERNAL MEDICINE

## 2021-01-07 PROCEDURE — G8510 SCR DEP NEG, NO PLAN REQD: HCPCS | Performed by: INTERNAL MEDICINE

## 2021-01-07 RX ORDER — METOPROLOL SUCCINATE 100 MG/1
1 TABLET, EXTENDED RELEASE ORAL DAILY
COMMUNITY
Start: 2020-12-18 | End: 2021-03-19 | Stop reason: SDUPTHER

## 2021-01-07 NOTE — PROGRESS NOTES
Callum Hernandez. is a 72 y.o. male presenting for   hypertension      Chief Complaint   Patient presents with    Hypertension        HPI Sonu Cantu comes in and he is asymptomatic. He is generally feeling well. The flank pain he had the last time he was here has completely resolved. No problems with his medications and is now 100 mg of metoprolol. Past Medical History:   Diagnosis Date    Arrhythmia     Hypertension         Current Outpatient Medications on File Prior to Visit   Medication Sig Dispense Refill    metoprolol succinate (TOPROL-XL) 100 mg tablet Take 1 Tab by mouth daily.  meloxicam (MOBIC) 15 mg tablet TAKE 1 TABLET BY MOUTH DAILY  90 Tab 3    losartan (COZAAR) 100 mg tablet TAKE 1 TABLET BY MOUTH DAILY 90 Tab 1    hydroCHLOROthiazide (MICROZIDE) 12.5 mg capsule TAKE 1 CAPSULE BY MOUTH DAILY 90 Cap 1    aspirin delayed-release 81 mg tablet Take 81 mg by mouth daily.  loratadine/pseudoephedrine (CLARITIN-D 12 HOUR PO) Take  by mouth daily.  cyanocobalamin 1,000 mcg tablet Take 1,000 mcg by mouth daily.  losartan-hydroCHLOROthiazide (HYZAAR) 100-12.5 mg per tablet Take 1 Tab by mouth daily. 90 Tab 3    warfarin (COUMADIN) 7.5 mg tablet TAKE 1 TABLET BY MOUTH DAILY 90 Tab 0    pantoprazole (PROTONIX) 40 mg tablet Take 40 mg by mouth daily.  warfarin (COUMADIN) 5 mg tablet Take 5 mg by mouth daily.  cyclobenzaprine (FLEXERIL) 10 mg tablet Take one half or 1 pill 3 times a day as needed 30 Tab 2    HYDROcodone-acetaminophen (NORCO) 5-325 mg per tablet Take  by mouth. No current facility-administered medications on file prior to visit.          ROS all systems are reviewed and negative except as noted in the history of present illness    Visit Vitals  Temp 98.1 °F (36.7 °C)   Resp 20   Ht 6' 2\" (1.88 m)   Wt 247 lb 12.8 oz (112.4 kg)   BMI 31.82 kg/m²        Physical Exam well-developed  man alert oriented cooperative no acute distress normocephalic pink conjunctiva anicteric sclera neck supple lungs bilaterally clear to auscultation heart regular rhythm no gallops or extrasystoles. Assessment & Plan change in present regimen. Return in 3 months.

## 2021-02-01 RX ORDER — WARFARIN SODIUM 5 MG/1
TABLET ORAL
Qty: 90 TAB | Refills: 3 | Status: SHIPPED | OUTPATIENT
Start: 2021-02-01 | End: 2021-06-14

## 2021-02-01 RX ORDER — WARFARIN 7.5 MG/1
TABLET ORAL
Qty: 90 TAB | Refills: 0 | Status: SHIPPED | OUTPATIENT
Start: 2021-02-01 | End: 2021-06-14 | Stop reason: DRUGHIGH

## 2021-03-02 ENCOUNTER — TELEPHONE (OUTPATIENT)
Dept: INTERNAL MEDICINE CLINIC | Age: 66
End: 2021-03-02

## 2021-03-02 DIAGNOSIS — Z86.718 HISTORY OF DVT (DEEP VEIN THROMBOSIS): Primary | ICD-10-CM

## 2021-03-04 ENCOUNTER — HOSPITAL ENCOUNTER (OUTPATIENT)
Dept: LAB | Age: 66
Discharge: HOME | End: 2021-03-04
Payer: MEDICARE

## 2021-03-04 DIAGNOSIS — Z86.718 HISTORY OF DVT (DEEP VEIN THROMBOSIS): ICD-10-CM

## 2021-03-04 LAB
INR PPP: 2.5 (ref 0.8–1.2)
PROTHROMBIN TIME: 26.4 SEC (ref 11.5–15.2)

## 2021-03-04 PROCEDURE — 85610 PROTHROMBIN TIME: CPT

## 2021-03-04 PROCEDURE — 36415 COLL VENOUS BLD VENIPUNCTURE: CPT

## 2021-03-05 RX ORDER — SILDENAFIL 100 MG/1
TABLET, FILM COATED ORAL
Qty: 4 TAB | Refills: 5 | Status: SHIPPED | OUTPATIENT
Start: 2021-03-05 | End: 2022-11-02 | Stop reason: CLARIF

## 2021-03-19 RX ORDER — METOPROLOL SUCCINATE 100 MG/1
100 TABLET, EXTENDED RELEASE ORAL DAILY
Qty: 90 TAB | Refills: 2 | Status: SHIPPED | OUTPATIENT
Start: 2021-03-19 | End: 2021-06-17

## 2021-04-05 ENCOUNTER — TELEPHONE (OUTPATIENT)
Dept: INTERNAL MEDICINE CLINIC | Age: 66
End: 2021-04-05

## 2021-04-05 ENCOUNTER — HOSPITAL ENCOUNTER (OUTPATIENT)
Dept: LAB | Age: 66
Discharge: HOME | End: 2021-04-05
Payer: MEDICARE

## 2021-04-05 ENCOUNTER — TRANSCRIBE ORDER (OUTPATIENT)
Dept: REGISTRATION | Age: 66
End: 2021-04-05

## 2021-04-05 DIAGNOSIS — Z01.89 ROUTINE LAB DRAW: ICD-10-CM

## 2021-04-05 DIAGNOSIS — Z86.718 HISTORY OF DVT (DEEP VEIN THROMBOSIS): Primary | ICD-10-CM

## 2021-04-05 DIAGNOSIS — Z01.89 ROUTINE LAB DRAW: Primary | ICD-10-CM

## 2021-04-05 LAB
INR PPP: 2.4 (ref 0.8–1.2)
PROTHROMBIN TIME: 25.5 SEC (ref 11.5–15.2)

## 2021-04-05 PROCEDURE — 85610 PROTHROMBIN TIME: CPT

## 2021-04-05 PROCEDURE — 36415 COLL VENOUS BLD VENIPUNCTURE: CPT

## 2021-04-08 ENCOUNTER — OFFICE VISIT (OUTPATIENT)
Dept: INTERNAL MEDICINE CLINIC | Age: 66
End: 2021-04-08
Payer: MEDICARE

## 2021-04-08 VITALS
DIASTOLIC BLOOD PRESSURE: 80 MMHG | RESPIRATION RATE: 20 BRPM | TEMPERATURE: 97.5 F | BODY MASS INDEX: 31.16 KG/M2 | SYSTOLIC BLOOD PRESSURE: 124 MMHG | HEART RATE: 64 BPM | WEIGHT: 242.8 LBS | HEIGHT: 74 IN

## 2021-04-08 DIAGNOSIS — R10.9 LEFT FLANK PAIN: ICD-10-CM

## 2021-04-08 DIAGNOSIS — I10 ESSENTIAL HYPERTENSION: ICD-10-CM

## 2021-04-08 DIAGNOSIS — M54.50 ACUTE LOW BACK PAIN, UNSPECIFIED BACK PAIN LATERALITY, UNSPECIFIED WHETHER SCIATICA PRESENT: Primary | ICD-10-CM

## 2021-04-08 DIAGNOSIS — Z86.718 HISTORY OF DVT (DEEP VEIN THROMBOSIS): ICD-10-CM

## 2021-04-08 PROCEDURE — G8427 DOCREV CUR MEDS BY ELIG CLIN: HCPCS | Performed by: INTERNAL MEDICINE

## 2021-04-08 PROCEDURE — G8754 DIAS BP LESS 90: HCPCS | Performed by: INTERNAL MEDICINE

## 2021-04-08 PROCEDURE — 3017F COLORECTAL CA SCREEN DOC REV: CPT | Performed by: INTERNAL MEDICINE

## 2021-04-08 PROCEDURE — G8536 NO DOC ELDER MAL SCRN: HCPCS | Performed by: INTERNAL MEDICINE

## 2021-04-08 PROCEDURE — 99213 OFFICE O/P EST LOW 20 MIN: CPT | Performed by: INTERNAL MEDICINE

## 2021-04-08 PROCEDURE — 1101F PT FALLS ASSESS-DOCD LE1/YR: CPT | Performed by: INTERNAL MEDICINE

## 2021-04-08 PROCEDURE — G8510 SCR DEP NEG, NO PLAN REQD: HCPCS | Performed by: INTERNAL MEDICINE

## 2021-04-08 PROCEDURE — G8752 SYS BP LESS 140: HCPCS | Performed by: INTERNAL MEDICINE

## 2021-04-08 PROCEDURE — G8417 CALC BMI ABV UP PARAM F/U: HCPCS | Performed by: INTERNAL MEDICINE

## 2021-04-08 NOTE — PROGRESS NOTES
Luanne Mendoza. presents today for   Chief Complaint   Patient presents with    Hypertension       Is someone accompanying this pt? no    Is the patient using any DME equipment during [address]? no    Depression Screening:  3 most recent PHQ Screens 4/8/2021   Little interest or pleasure in doing things Not at all   Feeling down, depressed, irritable, or hopeless Not at all   Total Score PHQ 2 0       Learning Assessment:  Learning Assessment 10/5/2020   PRIMARY LEARNER Patient   HIGHEST LEVEL OF EDUCATION - PRIMARY LEARNER  GRADUATED HIGH SCHOOL OR GED   BARRIERS PRIMARY LEARNER NONE   CO-LEARNER CAREGIVER No   PRIMARY LANGUAGE ENGLISH   LEARNER PREFERENCE PRIMARY DEMONSTRATION   ANSWERED BY patient   RELATIONSHIP SELF       Abuse Screening:  Abuse Screening Questionnaire 4/8/2021   Do you ever feel afraid of your partner? N   Are you in a relationship with someone who physically or mentally threatens you? N   Is it safe for you to go home? Y       Fall Risk  Fall Risk Assessment, last 12 mths 4/8/2021   Able to walk? Yes   Fall in past 12 months? 0       ADL  ADL Assessment 4/8/2021   Feeding yourself No Help Needed   Getting from bed to chair No Help Needed   Getting dressed No Help Needed   Bathing or showering No Help Needed   Walk across the room (includes cane/walker) No Help Needed   Using the telphone No Help Needed   Taking your medications No Help Needed   Preparing meals No Help Needed   Managing money (expenses/bills) No Help Needed   Moderately strenuous housework (laundry) No Help Needed   Shopping for personal items (toiletries/medicines) No Help Needed   Shopping for groceries No Help Needed   Driving No Help Needed   Climbing a flight of stairs No Help Needed   Getting to places beyond walking distances No Help Needed       Health Maintenance reviewed and discussed and ordered per Provider.     Health Maintenance Due   Topic Date Due    Hepatitis C Screening  Never done    COVID-19 Vaccine (1) Never done    DTaP/Tdap/Td series (1 - Tdap) Never done    Shingrix Vaccine Age 50> (1 of 2) Never done    AAA Screening 73-67 YO Male Smoking Patients  Never done    Pneumococcal 65+ years (1 of 1 - PPSV23) Never done   . Coordination of Care:  1. Have you been to the ER, urgent care clinic since your last visit? Hospitalized since your last visit? no    2. Have you seen or consulted any other health care providers outside of the [address] [place] [address] since your last visit? Include any pap smears or colon screening.  no

## 2021-04-08 NOTE — PROGRESS NOTES
Boris Rodriguez. is a 72 y.o. male presenting for hypertension          Chief Complaint   Patient presents with    Hypertension        HPI Moreno down for routine follow-up of his hypertension. From that standpoint he was doing well and he had a INR done 1 Monday which was 2.4 and that is just about perfect. He wants to talk about his intermittent chronic low back pain on the left-hand side. It is usually worse with motion or movement and rest seems to help it. Sometimes when he turns to the left he gets a sharp pain there. He says that the back surgeon told him when he had a surgery years ago that if he was having pain there would not be from his spine. It sounds like rather bold prediction but it certainly does sound musculoskeletal to me. He still does this exercises for postop from his surgery. He is on Coumadin on a chronic basis. Past Medical History:   Diagnosis Date    Arrhythmia     Hypertension         Current Outpatient Medications on File Prior to Visit   Medication Sig Dispense Refill    metoprolol succinate (TOPROL-XL) 100 mg tablet Take 1 Tab by mouth daily for 90 days. 90 Tab 2    sildenafil citrate (VIAGRA) 100 mg tablet TAKE 1 TABLET BY MOUTH ONCE DAILY AS NEEDED FOR 4 DAYS. 4 Tab 5    warfarin (COUMADIN) 5 mg tablet TAKE 1 TABLET BY MOUTH EVERY OTHER DAY ALTERNATING WITH 7.5 MG  90 Tab 3    warfarin (COUMADIN) 7.5 mg tablet TAKE 1 TABLET BY MOUTH DAILY  90 Tab 0    meloxicam (MOBIC) 15 mg tablet TAKE 1 TABLET BY MOUTH DAILY  90 Tab 3    cyclobenzaprine (FLEXERIL) 10 mg tablet Take one half or 1 pill 3 times a day as needed 30 Tab 2    losartan (COZAAR) 100 mg tablet TAKE 1 TABLET BY MOUTH DAILY 90 Tab 1    hydroCHLOROthiazide (MICROZIDE) 12.5 mg capsule TAKE 1 CAPSULE BY MOUTH DAILY 90 Cap 1    aspirin delayed-release 81 mg tablet Take 81 mg by mouth daily.  loratadine/pseudoephedrine (CLARITIN-D 12 HOUR PO) Take  by mouth daily.       cyanocobalamin 1,000 mcg tablet Take 1,000 mcg by mouth daily.  losartan-hydroCHLOROthiazide (HYZAAR) 100-12.5 mg per tablet Take 1 Tab by mouth daily. 90 Tab 3    pantoprazole (PROTONIX) 40 mg tablet Take 40 mg by mouth daily.  [DISCONTINUED] HYDROcodone-acetaminophen (NORCO) 5-325 mg per tablet Take  by mouth. No current facility-administered medications on file prior to visit. ROS all systems reviewed and negative except as noted in the history of present illness    Visit Vitals  /80 (BP 1 Location: Right arm, BP Patient Position: Sitting, BP Cuff Size: Adult)   Pulse 64   Temp 97.5 °F (36.4 °C)   Resp 20   Ht 6' 2\" (1.88 m)   Wt 242 lb 12.8 oz (110.1 kg)   BMI 31.17 kg/m²        Physical Exam well-developed  man alert oriented cooperative no acute distress normocephalic ENT unremarkable neck supple lungs clear to auscultation heart regular rhythm no gallops lower extremity exam unrevealing    Assessment & Plan Antonino's anticoagulation makes medical intervention a little bit problematic. I thought maybe we would try some physical therapy and will make a referral to [facility] where he did therapy before and see if they can help us out.

## 2021-05-04 ENCOUNTER — HOSPITAL ENCOUNTER (OUTPATIENT)
Dept: LAB | Age: 66
Discharge: HOME | End: 2021-05-04
Payer: MEDICARE

## 2021-05-04 ENCOUNTER — TRANSCRIBE ORDER (OUTPATIENT)
Dept: REGISTRATION | Age: 66
End: 2021-05-04

## 2021-05-04 DIAGNOSIS — E83.118 OTHER HEMOCHROMATOSIS: Primary | ICD-10-CM

## 2021-05-04 DIAGNOSIS — Z86.718 HISTORY OF DVT (DEEP VEIN THROMBOSIS): ICD-10-CM

## 2021-05-04 DIAGNOSIS — E83.118 OTHER HEMOCHROMATOSIS: ICD-10-CM

## 2021-05-04 LAB
ALBUMIN SERPL-MCNC: 4 G/DL (ref 3.5–4.7)
ALBUMIN/GLOB SERPL: 1.5 {RATIO}
ALP SERPL-CCNC: 63 U/L (ref 38–126)
ALT SERPL-CCNC: 27 U/L (ref 3–72)
ANION GAP SERPL CALC-SCNC: 8 MMOL/L
AST SERPL W P-5'-P-CCNC: 28 U/L (ref 17–74)
BASOPHILS # BLD: 0 K/UL (ref 0–0.1)
BASOPHILS NFR BLD: 1 % (ref 0–2)
BILIRUB SERPL-MCNC: 0.7 MG/DL (ref 0.2–1)
BUN SERPL-MCNC: 19 MG/DL (ref 9–21)
BUN/CREAT SERPL: 21
CA-I BLD-MCNC: 8.7 MG/DL (ref 8.5–10.5)
CHLORIDE SERPL-SCNC: 107 MMOL/L (ref 94–111)
CO2 SERPL-SCNC: 23 MMOL/L (ref 21–33)
CREAT SERPL-MCNC: 0.9 MG/DL (ref 0.8–1.5)
EOSINOPHIL # BLD: 0.2 K/UL (ref 0–0.4)
EOSINOPHIL NFR BLD: 4 % (ref 0–5)
ERYTHROCYTE [DISTWIDTH] IN BLOOD BY AUTOMATED COUNT: 13 % (ref 11.6–14.5)
GLOBULIN SER CALC-MCNC: 2.7 G/DL
GLUCOSE SERPL-MCNC: 131 MG/DL (ref 70–110)
HCT VFR BLD AUTO: 47.7 % (ref 36–48)
HGB BLD-MCNC: 16 G/DL (ref 13–16)
IMM GRANULOCYTES # BLD AUTO: 0 K/UL
IMM GRANULOCYTES NFR BLD AUTO: 0 %
INR PPP: 2.7 (ref 0.8–1.2)
LYMPHOCYTES # BLD: 1.6 K/UL (ref 0.9–3.6)
LYMPHOCYTES NFR BLD: 26 % (ref 21–52)
MCH RBC QN AUTO: 31.9 PG (ref 24–34)
MCHC RBC AUTO-ENTMCNC: 33.5 G/DL (ref 31–37)
MCV RBC AUTO: 95 FL (ref 74–97)
MONOCYTES # BLD: 0.7 K/UL (ref 0.05–1.2)
MONOCYTES NFR BLD: 11 % (ref 3–10)
NEUTS SEG # BLD: 3.5 K/UL (ref 1.8–8)
NEUTS SEG NFR BLD: 58 % (ref 40–73)
PLATELET # BLD AUTO: 189 K/UL (ref 135–420)
PMV BLD AUTO: 11.8 FL (ref 9.2–11.8)
POTASSIUM SERPL-SCNC: 3.8 MMOL/L (ref 3.2–5.1)
PROT SERPL-MCNC: 6.7 G/DL (ref 6.1–8.4)
PROTHROMBIN TIME: 27.6 SEC (ref 11.5–15.2)
RBC # BLD AUTO: 5.02 M/UL (ref 4.7–5.5)
SODIUM SERPL-SCNC: 138 MMOL/L (ref 135–145)
WBC # BLD AUTO: 6 K/UL (ref 4.6–13.2)

## 2021-05-04 PROCEDURE — 83550 IRON BINDING TEST: CPT

## 2021-05-04 PROCEDURE — 85025 COMPLETE CBC W/AUTO DIFF WBC: CPT

## 2021-05-04 PROCEDURE — 80053 COMPREHEN METABOLIC PANEL: CPT

## 2021-05-04 PROCEDURE — 85610 PROTHROMBIN TIME: CPT

## 2021-05-04 PROCEDURE — 36415 COLL VENOUS BLD VENIPUNCTURE: CPT

## 2021-05-04 PROCEDURE — 83540 ASSAY OF IRON: CPT

## 2021-05-04 PROCEDURE — 82728 ASSAY OF FERRITIN: CPT

## 2021-05-05 LAB
FERRITIN SERPL-MCNC: 117 NG/ML (ref 26–388)
IRON SERPL-MCNC: 185 UG/DL (ref 35–150)
TIBC SERPL-MCNC: 303 UG/DL (ref 250–450)

## 2021-06-01 ENCOUNTER — TELEPHONE (OUTPATIENT)
Dept: INTERNAL MEDICINE CLINIC | Age: 66
End: 2021-06-01

## 2021-06-01 ENCOUNTER — HOSPITAL ENCOUNTER (OUTPATIENT)
Dept: LAB | Age: 66
Discharge: HOME | End: 2021-06-01
Payer: MEDICARE

## 2021-06-01 ENCOUNTER — TRANSCRIBE ORDER (OUTPATIENT)
Dept: REGISTRATION | Age: 66
End: 2021-06-01

## 2021-06-01 DIAGNOSIS — Z86.718 HISTORY OF DVT (DEEP VEIN THROMBOSIS): Primary | ICD-10-CM

## 2021-06-01 DIAGNOSIS — Z86.711 PERSONAL HISTORY OF PE (PULMONARY EMBOLISM): Primary | ICD-10-CM

## 2021-06-01 DIAGNOSIS — Z86.711 PERSONAL HISTORY OF PE (PULMONARY EMBOLISM): ICD-10-CM

## 2021-06-01 LAB
INR PPP: 2.7 (ref 0.8–1.2)
PROTHROMBIN TIME: 27.4 SEC (ref 11.5–15.2)

## 2021-06-01 PROCEDURE — 36415 COLL VENOUS BLD VENIPUNCTURE: CPT

## 2021-06-01 PROCEDURE — 85610 PROTHROMBIN TIME: CPT

## 2021-06-14 RX ORDER — WARFARIN SODIUM 5 MG/1
TABLET ORAL
Qty: 30 TABLET | Refills: 5 | Status: SHIPPED | OUTPATIENT
Start: 2021-06-14 | End: 2022-03-11 | Stop reason: SDUPTHER

## 2021-06-14 RX ORDER — WARFARIN SODIUM 5 MG/1
TABLET ORAL
Qty: 90 TABLET | Refills: 3 | Status: SHIPPED | OUTPATIENT
Start: 2021-06-14 | End: 2021-06-14 | Stop reason: DRUGHIGH

## 2021-06-21 DIAGNOSIS — Z86.718 HISTORY OF DVT (DEEP VEIN THROMBOSIS): Primary | ICD-10-CM

## 2021-06-21 RX ORDER — WARFARIN 7.5 MG/1
7.5 TABLET ORAL DAILY
Qty: 90 TABLET | Refills: 3 | Status: SHIPPED | OUTPATIENT
Start: 2021-06-21 | End: 2022-03-31

## 2021-06-21 RX ORDER — WARFARIN 7.5 MG/1
7.5 TABLET ORAL DAILY
COMMUNITY
End: 2021-06-21 | Stop reason: SDUPTHER

## 2021-07-20 RX ORDER — PANTOPRAZOLE SODIUM 40 MG/1
TABLET, DELAYED RELEASE ORAL
Qty: 90 TABLET | Refills: 3 | Status: SHIPPED | OUTPATIENT
Start: 2021-07-20 | End: 2021-12-17 | Stop reason: SDUPTHER

## 2021-08-02 ENCOUNTER — HOSPITAL ENCOUNTER (OUTPATIENT)
Dept: LAB | Age: 66
Discharge: HOME | End: 2021-08-02
Payer: MEDICARE

## 2021-08-02 DIAGNOSIS — Z86.718 HISTORY OF DVT (DEEP VEIN THROMBOSIS): ICD-10-CM

## 2021-08-02 LAB
INR PPP: 3.1 (ref 0.8–1.2)
PROTHROMBIN TIME: 30.6 SEC (ref 11.5–15.2)

## 2021-08-02 PROCEDURE — 85610 PROTHROMBIN TIME: CPT

## 2021-08-02 PROCEDURE — 36415 COLL VENOUS BLD VENIPUNCTURE: CPT

## 2021-08-09 RX ORDER — HYDROCHLOROTHIAZIDE 12.5 MG/1
CAPSULE ORAL
Qty: 90 CAPSULE | Refills: 1 | Status: SHIPPED | OUTPATIENT
Start: 2021-08-09 | End: 2021-12-17 | Stop reason: SDUPTHER

## 2021-08-09 RX ORDER — LOSARTAN POTASSIUM 100 MG/1
TABLET ORAL
Qty: 90 TABLET | Refills: 1 | Status: SHIPPED | OUTPATIENT
Start: 2021-08-09 | End: 2021-12-17 | Stop reason: SDUPTHER

## 2021-10-04 ENCOUNTER — TRANSCRIBE ORDER (OUTPATIENT)
Dept: REGISTRATION | Age: 66
End: 2021-10-04

## 2021-10-04 ENCOUNTER — HOSPITAL ENCOUNTER (OUTPATIENT)
Dept: LAB | Age: 66
Discharge: HOME | End: 2021-10-04
Payer: MEDICARE

## 2021-10-04 ENCOUNTER — TELEPHONE (OUTPATIENT)
Dept: INTERNAL MEDICINE CLINIC | Age: 66
End: 2021-10-04

## 2021-10-04 DIAGNOSIS — Z86.718 PERSONAL HISTORY OF VENOUS THROMBOSIS AND EMBOLISM: Primary | ICD-10-CM

## 2021-10-04 DIAGNOSIS — Z86.718 HISTORY OF DVT (DEEP VEIN THROMBOSIS): Primary | ICD-10-CM

## 2021-10-04 DIAGNOSIS — Z86.718 PERSONAL HISTORY OF VENOUS THROMBOSIS AND EMBOLISM: ICD-10-CM

## 2021-10-04 LAB
INR PPP: 2.9 (ref 0.8–1.2)
PROTHROMBIN TIME: 29.3 SEC (ref 11.5–15.2)

## 2021-10-04 PROCEDURE — 85610 PROTHROMBIN TIME: CPT

## 2021-10-04 PROCEDURE — 36415 COLL VENOUS BLD VENIPUNCTURE: CPT

## 2021-11-01 ENCOUNTER — HOSPITAL ENCOUNTER (OUTPATIENT)
Dept: LAB | Age: 66
Discharge: HOME | End: 2021-11-01
Payer: MEDICARE

## 2021-11-01 DIAGNOSIS — Z86.718 HISTORY OF DVT (DEEP VEIN THROMBOSIS): ICD-10-CM

## 2021-11-01 LAB
INR PPP: 2.4 (ref 0.8–1.2)
PROTHROMBIN TIME: 25.2 SEC (ref 11.5–15.2)

## 2021-11-01 PROCEDURE — 85610 PROTHROMBIN TIME: CPT

## 2021-11-01 PROCEDURE — 36415 COLL VENOUS BLD VENIPUNCTURE: CPT

## 2021-11-02 ENCOUNTER — OFFICE VISIT (OUTPATIENT)
Dept: INTERNAL MEDICINE CLINIC | Age: 66
End: 2021-11-02
Payer: MEDICARE

## 2021-11-02 ENCOUNTER — HOSPITAL ENCOUNTER (OUTPATIENT)
Dept: LAB | Age: 66
Discharge: HOME | End: 2021-11-02
Payer: MEDICARE

## 2021-11-02 VITALS
SYSTOLIC BLOOD PRESSURE: 160 MMHG | WEIGHT: 240.2 LBS | DIASTOLIC BLOOD PRESSURE: 100 MMHG | RESPIRATION RATE: 20 BRPM | HEIGHT: 74 IN | BODY MASS INDEX: 30.83 KG/M2

## 2021-11-02 DIAGNOSIS — R35.0 URINARY FREQUENCY: ICD-10-CM

## 2021-11-02 DIAGNOSIS — Z00.00 MEDICARE ANNUAL WELLNESS VISIT, SUBSEQUENT: Primary | ICD-10-CM

## 2021-11-02 DIAGNOSIS — E83.110 PRIMARY HEMOCHROMATOSIS (HCC): ICD-10-CM

## 2021-11-02 DIAGNOSIS — R03.0 ELEVATED BLOOD PRESSURE READING WITHOUT DIAGNOSIS OF HYPERTENSION: ICD-10-CM

## 2021-11-02 DIAGNOSIS — Z00.00 WELCOME TO MEDICARE PREVENTIVE VISIT: ICD-10-CM

## 2021-11-02 LAB
APPEARANCE UR: CLEAR
BACTERIA URNS QL MICRO: ABNORMAL /HPF
BILIRUB UR QL: NEGATIVE
COLOR UR: YELLOW
EPITH CASTS URNS QL MICRO: ABNORMAL /LPF (ref 0–20)
GLUCOSE UR STRIP.AUTO-MCNC: NEGATIVE MG/DL
HGB UR QL STRIP: ABNORMAL
KETONES UR QL STRIP.AUTO: NEGATIVE MG/DL
LEUKOCYTE ESTERASE UR QL STRIP.AUTO: NEGATIVE
NITRITE UR QL STRIP.AUTO: NEGATIVE
PH UR STRIP: 6 [PH] (ref 5–8)
PROT UR STRIP-MCNC: NEGATIVE MG/DL
RBC #/AREA URNS HPF: ABNORMAL /HPF (ref 0–2)
SP GR UR REFRACTOMETRY: 1.02 (ref 1–1.03)
UROBILINOGEN UR QL STRIP.AUTO: 0.2 EU/DL (ref 0.2–1)
WBC URNS QL MICRO: ABNORMAL /HPF (ref 0–4)

## 2021-11-02 PROCEDURE — G0439 PPPS, SUBSEQ VISIT: HCPCS | Performed by: INTERNAL MEDICINE

## 2021-11-02 PROCEDURE — G8427 DOCREV CUR MEDS BY ELIG CLIN: HCPCS | Performed by: INTERNAL MEDICINE

## 2021-11-02 PROCEDURE — 81001 URINALYSIS AUTO W/SCOPE: CPT

## 2021-11-02 PROCEDURE — G8417 CALC BMI ABV UP PARAM F/U: HCPCS | Performed by: INTERNAL MEDICINE

## 2021-11-02 PROCEDURE — G8510 SCR DEP NEG, NO PLAN REQD: HCPCS | Performed by: INTERNAL MEDICINE

## 2021-11-02 PROCEDURE — 87086 URINE CULTURE/COLONY COUNT: CPT

## 2021-11-02 PROCEDURE — 1101F PT FALLS ASSESS-DOCD LE1/YR: CPT | Performed by: INTERNAL MEDICINE

## 2021-11-02 PROCEDURE — G8536 NO DOC ELDER MAL SCRN: HCPCS | Performed by: INTERNAL MEDICINE

## 2021-11-02 PROCEDURE — G8755 DIAS BP > OR = 90: HCPCS | Performed by: INTERNAL MEDICINE

## 2021-11-02 PROCEDURE — 3017F COLORECTAL CA SCREEN DOC REV: CPT | Performed by: INTERNAL MEDICINE

## 2021-11-02 PROCEDURE — G8753 SYS BP > OR = 140: HCPCS | Performed by: INTERNAL MEDICINE

## 2021-11-02 RX ORDER — METOPROLOL SUCCINATE 100 MG/1
1 TABLET, EXTENDED RELEASE ORAL DAILY
COMMUNITY
End: 2021-12-17 | Stop reason: SDUPTHER

## 2021-11-02 RX ORDER — LANOLIN ALCOHOL/MO/W.PET/CERES
1000 CREAM (GRAM) TOPICAL DAILY
COMMUNITY
End: 2022-03-31

## 2021-11-02 RX ORDER — SULFAMETHOXAZOLE AND TRIMETHOPRIM 800; 160 MG/1; MG/1
1 TABLET ORAL 2 TIMES DAILY
Qty: 28 TABLET | Refills: 0 | Status: SHIPPED | OUTPATIENT
Start: 2021-11-02 | End: 2021-11-16

## 2021-11-02 NOTE — PROGRESS NOTES
Ge Messina. is a 77 y.o. male presenting for annual Medicare wellness          Chief Complaint   Patient presents with    Annual Wellness Visit    Urgency        HPI you comes in today and is doing well except for the fact that he urinates every hour. He wakes up every hour when he is at night and during the day it seems even more frequent. He cannot even drive to [place] it does not seem like without stopping to urinate. He is not having any burning or urgency necessarily. He has found that if he waits too long and holds his urine the he wants of having a bowel movement at the same time. He gets his eyes checked regularly and is up-to-date on his vaccinations we discussed the shingles vaccine and I recommended it to him despite the cost.  We discussed colonoscopy and he is up-to-date on that. He went to see the doctor last year and he told him he had 2 more years before he needed have it repeated. He has not had any bowel symptoms. He is still seeing his hematologist for his primary hemochromatosis. INR is therapeutic    Past Medical History:   Diagnosis Date    Arrhythmia     Hypertension         Current Outpatient Medications on File Prior to Visit   Medication Sig Dispense Refill    metoprolol succinate (TOPROL-XL) 100 mg tablet Take 1 Tablet by mouth daily.  cyanocobalamin 1,000 mcg tablet Take 1,000 mcg by mouth daily.  losartan (COZAAR) 100 mg tablet TAKE 1 TABLET BY MOUTH DAILY  90 Tablet 1    hydroCHLOROthiazide (MICROZIDE) 12.5 mg capsule TAKE 1 CAPSULE BY MOUTH DAILY  90 Capsule 1    pantoprazole (PROTONIX) 40 mg tablet TAKE 1 TABLET BY MOUTH DAILY  90 Tablet 3    warfarin (COUMADIN) 7.5 mg tablet Take 1 Tablet by mouth daily. Take one daily 90 Tablet 3    warfarin (COUMADIN) 5 mg tablet Take 2 pills every third day. 30 Tablet 5    sildenafil citrate (VIAGRA) 100 mg tablet TAKE 1 TABLET BY MOUTH ONCE DAILY AS NEEDED FOR 4 DAYS.   4 Tab 5    meloxicam (MOBIC) 15 mg tablet TAKE 1 TABLET BY MOUTH DAILY  90 Tab 3    aspirin delayed-release 81 mg tablet Take 81 mg by mouth daily.  loratadine/pseudoephedrine (CLARITIN-D 12 HOUR PO) Take  by mouth daily.  [DISCONTINUED] cyclobenzaprine (FLEXERIL) 10 mg tablet Take one half or 1 pill 3 times a day as needed 30 Tab 2    [DISCONTINUED] cyanocobalamin 1,000 mcg tablet Take 1,000 mcg by mouth daily.  [DISCONTINUED] losartan-hydroCHLOROthiazide (HYZAAR) 100-12.5 mg per tablet Take 1 Tab by mouth daily. 90 Tab 3     No current facility-administered medications on file prior to visit. ROS all systems reviewed and negative except as noted in the history of present illness    Visit Vitals  Resp 20   Ht 6' 2\" (1.88 m)   Wt 240 lb 3.2 oz (109 kg)   BMI 30.84 kg/m²        Physical Exam well-developed well-nourished  male alert oriented cooperative no acute distress normocephalic conjunctiva pink neck supple no lymphadenopathy ENT unremarkable lungs bilaterally clear to auscultation heart regular rhythm no murmurs or extrasystoles abdomen soft nontender extremities benign good peripheral pulses rectal exam revealed diffusely enlarged soft prostate which does elicit some out of proportion discomfort on palpation    Assessment & Plan may be Claudette Melendrez has a little mild smoldering prostatitis and the following give him a couple weeks of Bactrim to see if that would improve things. I am a little concerned about his blood pressure being up and I guess we can increase his metoprolol but he is probably going to need another agent.   I had like to see him back in 2 weeks after he tries the Bactrim and then we will go from there        Elpidio Tidwell MD

## 2021-11-02 NOTE — PROGRESS NOTES
This is the Subsequent Medicare Annual Wellness Exam, performed 12 months or more after the Initial AWV or the last Subsequent AWV    I have reviewed the patient's medical history in detail and updated the computerized patient record. Assessment/Plan   Education and counseling provided:  Are appropriate based on today's review and evaluation    1. Medicare annual wellness visit, subsequent       Depression Risk Factor Screening     3 most recent PHQ Screens 11/2/2021   Little interest or pleasure in doing things Not at all   Feeling down, depressed, irritable, or hopeless Not at all   Total Score PHQ 2 0       Alcohol Risk Screen    Do you average more than 1 drink per night or more than 7 drinks a week: No    In the past three months have you have had more than 4 drinks containing alcohol on one occasion: No        Functional Ability and Level of Safety    Hearing: Hearing is good. Activities of Daily Living: The home contains: no safety equipment. Patient does total self care      Ambulation: with no difficulty     Fall Risk:  Fall Risk Assessment, last 12 mths 11/2/2021   Able to walk? Yes   Fall in past 12 months? 0   Do you feel unsteady?  0   Are you worried about falling 0      Abuse Screen:  Patient is not abused       Cognitive Screening    Has your family/caregiver stated any concerns about your memory: no     Cognitive Screening: Normal - Verbal Fluency Test    Health Maintenance Due     Health Maintenance Due   Topic Date Due    Hepatitis C Screening  Never done    DTaP/Tdap/Td series (1 - Tdap) Never done    Shingrix Vaccine Age 50> (1 of 2) Never done    AAA Screening 73-69 YO Male Smoking Patients  Never done    Pneumococcal 65+ years (1 of 1 - PPSV23) Never done    Flu Vaccine (1) 09/01/2021       Patient Care Team   Patient Care Team:  Henrietta Peterson MD as PCP - General (Internal Medicine)  Henrietta Peterson MD as PCP - [facility] Provider    History     Patient Active Problem List   Diagnosis Code    Chronic cough R05.3    Deep venous thrombosis (HCC) I82.409    Essential hypertension I10    Increased frequency of urination R35.0    Primary hemochromatosis ([facility] [address] E83.110    Left flank pain R10.9     Past Medical History:   Diagnosis Date    Arrhythmia     Hypertension       Past Surgical History:   Procedure Laterality Date    HX ORTHOPAEDIC      RIGHT ELBOW, RIGHT ROTATOR CUFF, RIGHT ARM, BILATERAL KNEE REPLACEMENT, 2 BACK SURGERIES    HX OTHER SURGICAL      SINUS X4     Current Outpatient Medications   Medication Sig Dispense Refill    metoprolol succinate (TOPROL-XL) 100 mg tablet Take 1 Tablet by mouth daily.  cyanocobalamin 1,000 mcg tablet Take 1,000 mcg by mouth daily.  losartan (COZAAR) 100 mg tablet TAKE 1 TABLET BY MOUTH DAILY  90 Tablet 1    hydroCHLOROthiazide (MICROZIDE) 12.5 mg capsule TAKE 1 CAPSULE BY MOUTH DAILY  90 Capsule 1    pantoprazole (PROTONIX) 40 mg tablet TAKE 1 TABLET BY MOUTH DAILY  90 Tablet 3    warfarin (COUMADIN) 7.5 mg tablet Take 1 Tablet by mouth daily. Take one daily 90 Tablet 3    warfarin (COUMADIN) 5 mg tablet Take 2 pills every third day. 30 Tablet 5    sildenafil citrate (VIAGRA) 100 mg tablet TAKE 1 TABLET BY MOUTH ONCE DAILY AS NEEDED FOR 4 DAYS. 4 Tab 5    meloxicam (MOBIC) 15 mg tablet TAKE 1 TABLET BY MOUTH DAILY  90 Tab 3    aspirin delayed-release 81 mg tablet Take 81 mg by mouth daily.  loratadine/pseudoephedrine (CLARITIN-D 12 HOUR PO) Take  by mouth daily. No Known Allergies    No family history on file.   Social History     Tobacco Use    Smoking status: Former Smoker     Packs/day: 0.50     Years: 20.00     Pack years: 10.00    Smokeless tobacco: Never Used   Substance Use Topics    Alcohol use: Not on file         Chester Bean LPN

## 2021-11-02 NOTE — PATIENT INSTRUCTIONS
Medicare Wellness Visit, Male The best way to live healthy is to have a lifestyle where you eat a well-balanced diet, exercise regularly, limit alcohol use, and quit all forms of tobacco/nicotine, if applicable. Regular preventive services are another way to keep healthy. Preventive services (vaccines, screening tests, monitoring & exams) can help personalize your care plan, which helps you manage your own care. Screening tests can find health problems at the earliest stages, when they are easiest to treat. [facility] follows the current, evidence-based guidelines published by the [facility] ([facility]) when recommending preventive services for our patients. Because we follow these guidelines, sometimes recommendations change over time as research supports it. (For example, a prostate screening blood test is no longer routinely recommended for men with no symptoms). Of course, you and your doctor may decide to screen more often for some diseases, based on your risk and co-morbidities (chronic disease you are already diagnosed with). Preventive services for you include: - Medicare offers their members a free annual wellness visit, which is time for you and your primary care provider to discuss and plan for your preventive service needs. Take advantage of this benefit every year! 
-All adults over age 72 should receive the recommended pneumonia vaccines. Current USPSTF guidelines recommend a series of two vaccines for the best pneumonia protection.  
-All adults should have a flu vaccine yearly and tetanus vaccine every 10 years. 
-All adults age 48 and older should receive the shingles vaccines (series of two vaccines).       
-All adults age 38-68 who are overweight should have a diabetes screening test once every three years.  
-Other screening tests & preventive services for persons with diabetes include: an eye exam to screen for diabetic retinopathy, a kidney function test, a foot exam, and stricter control over your cholesterol.  
-Cardiovascular screening for adults with routine risk involves an electrocardiogram (ECG) at intervals determined by the provider.  
-Colorectal cancer screening should be done for adults age 54-65 with no increased risk factors for colorectal cancer. There are a number of acceptable methods of screening for this type of cancer. Each test has its own benefits and drawbacks. Discuss with your provider what is most appropriate for you during your annual wellness visit. The different tests include: colonoscopy (considered the best screening method), a fecal occult blood test, a fecal DNA test, and sigmoidoscopy. 
-All adults born between [facility] should be screened once for Hepatitis C. 
-An Abdominal Aortic Aneurysm (AAA) Screening is recommended for men age 73-68 who has ever smoked in their lifetime. Here is a list of your current Health Maintenance items (your personalized list of preventive services) with a due date: 
Health Maintenance Due Topic Date Due  
 Hepatitis C Test  Never done  DTaP/Tdap/Td  (1 - Tdap) Never done  Shingles Vaccine (1 of 2) Never done  AAA Screening  Never done  Pneumococcal Vaccine (1 of 1 - PPSV23) Never done  Yearly Flu Vaccine (1) 09/01/2021

## 2021-11-03 LAB
BACTERIA SPEC CULT: NORMAL
SPECIAL REQUESTS,SREQ: NORMAL

## 2021-11-16 ENCOUNTER — OFFICE VISIT (OUTPATIENT)
Dept: INTERNAL MEDICINE CLINIC | Age: 66
End: 2021-11-16
Payer: MEDICARE

## 2021-11-16 VITALS
DIASTOLIC BLOOD PRESSURE: 70 MMHG | BODY MASS INDEX: 30.21 KG/M2 | TEMPERATURE: 97.8 F | HEIGHT: 74 IN | WEIGHT: 235.4 LBS | SYSTOLIC BLOOD PRESSURE: 110 MMHG

## 2021-11-16 DIAGNOSIS — I10 ESSENTIAL HYPERTENSION: ICD-10-CM

## 2021-11-16 DIAGNOSIS — R35.0 URINARY FREQUENCY: Primary | ICD-10-CM

## 2021-11-16 PROCEDURE — G8510 SCR DEP NEG, NO PLAN REQD: HCPCS | Performed by: INTERNAL MEDICINE

## 2021-11-16 PROCEDURE — G8536 NO DOC ELDER MAL SCRN: HCPCS | Performed by: INTERNAL MEDICINE

## 2021-11-16 PROCEDURE — G8754 DIAS BP LESS 90: HCPCS | Performed by: INTERNAL MEDICINE

## 2021-11-16 PROCEDURE — G8427 DOCREV CUR MEDS BY ELIG CLIN: HCPCS | Performed by: INTERNAL MEDICINE

## 2021-11-16 PROCEDURE — G8752 SYS BP LESS 140: HCPCS | Performed by: INTERNAL MEDICINE

## 2021-11-16 PROCEDURE — 3017F COLORECTAL CA SCREEN DOC REV: CPT | Performed by: INTERNAL MEDICINE

## 2021-11-16 PROCEDURE — 99213 OFFICE O/P EST LOW 20 MIN: CPT | Performed by: INTERNAL MEDICINE

## 2021-11-16 PROCEDURE — G8417 CALC BMI ABV UP PARAM F/U: HCPCS | Performed by: INTERNAL MEDICINE

## 2021-11-16 PROCEDURE — 1101F PT FALLS ASSESS-DOCD LE1/YR: CPT | Performed by: INTERNAL MEDICINE

## 2021-11-16 NOTE — PROGRESS NOTES
Mario Quan. is a 77 y.o. male presenting for follow-up of prostatitis          Chief Complaint   Patient presents with    Hypertension     BP has been running low and he had to stop his BP medicine     Prostatitis     Continues on antibiotic \"if he can finish it\". HPI Eriberto uMrry comes in first along with regard to his prostatitis his urination is tremendously better. He is only getting up once at night. Every 15-minute business is stopped and he thinks he might have been having a reaction to the sulfonamides but he was feeling so much better from a urinary standpoint he was willing to put up with the side effects in order to achieve improvement in his urination. He is quite pleased with his symptoms now and he did have a little bit of blood in his urine but no growth on his culture. I think we have to assume he had a mild smoldering prostatitis. However shortly after starting the antibiotics he just began to generally feel bad. He had malaise and low-grade fever at first. He was sleeping all the time. He actually went to the [employer] store in [facility] to get a Covid test to be sure he was not exhibiting those symptoms and that was negative. The fever is gone away and now he feels like his temperature  is subnormal and his  pressure is down. Past Medical History:   Diagnosis Date    Arrhythmia     Hypertension         Current Outpatient Medications on File Prior to Visit   Medication Sig Dispense Refill    metoprolol succinate (TOPROL-XL) 100 mg tablet Take 1 Tablet by mouth daily.  cyanocobalamin 1,000 mcg tablet Take 1,000 mcg by mouth daily.  trimethoprim-sulfamethoxazole (BACTRIM DS, SEPTRA DS) 160-800 mg per tablet Take 1 Tablet by mouth two (2) times a day for 14 days.  28 Tablet 0    losartan (COZAAR) 100 mg tablet TAKE 1 TABLET BY MOUTH DAILY  90 Tablet 1    hydroCHLOROthiazide (MICROZIDE) 12.5 mg capsule TAKE 1 CAPSULE BY MOUTH DAILY  90 Capsule 1    pantoprazole (PROTONIX) 40 mg tablet TAKE 1 TABLET BY MOUTH DAILY  90 Tablet 3    warfarin (COUMADIN) 7.5 mg tablet Take 1 Tablet by mouth daily. Take one daily 90 Tablet 3    warfarin (COUMADIN) 5 mg tablet Take 2 pills every third day. 30 Tablet 5    sildenafil citrate (VIAGRA) 100 mg tablet TAKE 1 TABLET BY MOUTH ONCE DAILY AS NEEDED FOR 4 DAYS. 4 Tab 5    meloxicam (MOBIC) 15 mg tablet TAKE 1 TABLET BY MOUTH DAILY  90 Tab 3    aspirin delayed-release 81 mg tablet Take 81 mg by mouth daily.  loratadine/pseudoephedrine (CLARITIN-D 12 HOUR PO) Take  by mouth daily. No current facility-administered medications on file prior to visit. ROS noted in the history of present illness    Visit Vitals  /70 (BP 1 Location: Right arm, BP Patient Position: Sitting, BP Cuff Size: Adult)   Temp 97.8 °F (36.6 °C)   Ht 6' 2\" (1.88 m)   Wt 235 lb 6.4 oz (106.8 kg)   BMI 30.22 kg/m²        Physical Exam well-developed  man alert oriented cooperative no acute distress normocephalic conjunctiva pink sclera anicteric neck no lymphadenopathy or mass lungs bilaterally clear to auscultation heart regular rhythm no gallops or extrasystoles abdomen soft extremities benign no rash    Assessment & Plan I think he probably had an adverse reaction to the sulfonamide but it  seems like it has cleared his prostatitis. He is going to stop the sulfonamides and let me check him again in the middle of December. I asked him to let me know if his prostate symptoms came back and we would consider using Cipro. In the meantime he is going to hold off on the other antihypertensive and he is checking his blood pressures on his present regimen including the months Toprol all and everything looks better.         Nolvia White MD

## 2021-12-17 ENCOUNTER — OFFICE VISIT (OUTPATIENT)
Dept: INTERNAL MEDICINE CLINIC | Age: 66
End: 2021-12-17
Payer: MEDICARE

## 2021-12-17 VITALS
BODY MASS INDEX: 30.42 KG/M2 | HEIGHT: 74 IN | SYSTOLIC BLOOD PRESSURE: 144 MMHG | WEIGHT: 237 LBS | DIASTOLIC BLOOD PRESSURE: 95 MMHG | RESPIRATION RATE: 20 BRPM

## 2021-12-17 DIAGNOSIS — Z86.718 HISTORY OF DVT (DEEP VEIN THROMBOSIS): ICD-10-CM

## 2021-12-17 DIAGNOSIS — I10 ESSENTIAL HYPERTENSION: Primary | ICD-10-CM

## 2021-12-17 PROCEDURE — G8753 SYS BP > OR = 140: HCPCS | Performed by: INTERNAL MEDICINE

## 2021-12-17 PROCEDURE — 99213 OFFICE O/P EST LOW 20 MIN: CPT | Performed by: INTERNAL MEDICINE

## 2021-12-17 PROCEDURE — G8427 DOCREV CUR MEDS BY ELIG CLIN: HCPCS | Performed by: INTERNAL MEDICINE

## 2021-12-17 PROCEDURE — 3017F COLORECTAL CA SCREEN DOC REV: CPT | Performed by: INTERNAL MEDICINE

## 2021-12-17 PROCEDURE — G8755 DIAS BP > OR = 90: HCPCS | Performed by: INTERNAL MEDICINE

## 2021-12-17 PROCEDURE — G8510 SCR DEP NEG, NO PLAN REQD: HCPCS | Performed by: INTERNAL MEDICINE

## 2021-12-17 PROCEDURE — G8536 NO DOC ELDER MAL SCRN: HCPCS | Performed by: INTERNAL MEDICINE

## 2021-12-17 PROCEDURE — G8417 CALC BMI ABV UP PARAM F/U: HCPCS | Performed by: INTERNAL MEDICINE

## 2021-12-17 PROCEDURE — 1101F PT FALLS ASSESS-DOCD LE1/YR: CPT | Performed by: INTERNAL MEDICINE

## 2021-12-17 RX ORDER — MELOXICAM 15 MG/1
15 TABLET ORAL DAILY
Qty: 90 TABLET | Refills: 3 | Status: SHIPPED | OUTPATIENT
Start: 2021-12-17

## 2021-12-17 RX ORDER — METOPROLOL SUCCINATE 100 MG/1
200 TABLET, EXTENDED RELEASE ORAL DAILY
Qty: 180 TABLET | Refills: 3 | Status: SHIPPED | OUTPATIENT
Start: 2021-12-17

## 2021-12-17 RX ORDER — LOSARTAN POTASSIUM 100 MG/1
100 TABLET ORAL DAILY
Qty: 90 TABLET | Refills: 3 | Status: SHIPPED | OUTPATIENT
Start: 2021-12-17

## 2021-12-17 RX ORDER — HYDROCHLOROTHIAZIDE 12.5 MG/1
12.5 CAPSULE ORAL DAILY
Qty: 90 CAPSULE | Refills: 3 | Status: SHIPPED | OUTPATIENT
Start: 2021-12-17 | End: 2022-03-31

## 2021-12-17 RX ORDER — PANTOPRAZOLE SODIUM 40 MG/1
40 TABLET, DELAYED RELEASE ORAL DAILY
Qty: 90 TABLET | Refills: 3 | Status: SHIPPED | OUTPATIENT
Start: 2021-12-17

## 2021-12-17 NOTE — PROGRESS NOTES
Rudy Murray. is a 77 y.o. male presenting for hypertension          Chief Complaint   Patient presents with    Follow-up     elevated blood pressure and prostatitis follow up, finished bactrim (stopped early due to low blood pressure)     Hypertension        HPI Alexandria Goode comes in and his blood pressure is back up. Urination is still better although he is getting up once or twice a night. He is pleased with that but he is concerned about his blood pressure. He asks who is going to be checking on his prothrombin time INR is every month and I told him what I have been told on hopeful was going to happen. Otherwise he is generally doing well except his blood pressure is creeping up. Past Medical History:   Diagnosis Date    Arrhythmia     Hypertension         Current Outpatient Medications on File Prior to Visit   Medication Sig Dispense Refill    metoprolol succinate (TOPROL-XL) 100 mg tablet Take 1 Tablet by mouth daily.  cyanocobalamin 1,000 mcg tablet Take 1,000 mcg by mouth daily.  losartan (COZAAR) 100 mg tablet TAKE 1 TABLET BY MOUTH DAILY  90 Tablet 1    hydroCHLOROthiazide (MICROZIDE) 12.5 mg capsule TAKE 1 CAPSULE BY MOUTH DAILY  90 Capsule 1    pantoprazole (PROTONIX) 40 mg tablet TAKE 1 TABLET BY MOUTH DAILY  90 Tablet 3    warfarin (COUMADIN) 7.5 mg tablet Take 1 Tablet by mouth daily. Take one daily 90 Tablet 3    warfarin (COUMADIN) 5 mg tablet Take 2 pills every third day. 30 Tablet 5    sildenafil citrate (VIAGRA) 100 mg tablet TAKE 1 TABLET BY MOUTH ONCE DAILY AS NEEDED FOR 4 DAYS. 4 Tab 5    meloxicam (MOBIC) 15 mg tablet TAKE 1 TABLET BY MOUTH DAILY  90 Tab 3    aspirin delayed-release 81 mg tablet Take 81 mg by mouth daily.  loratadine/pseudoephedrine (CLARITIN-D 12 HOUR PO) Take  by mouth daily. No current facility-administered medications on file prior to visit.         ROS as noted in the history of present illness    Visit Vitals  BP (!) 144/95 (BP 1 Location: Left arm, BP Patient Position: Sitting, BP Cuff Size: Adult)   Resp 20   Ht 6' 2\" (1.88 m)   Wt 237 lb (107.5 kg)   BMI 30.43 kg/m²        Physical Exam developed well-nourished  alert and oriented no distress normocephalic conjunctiva pink sclera anicteric neck no mass lungs clear cardiac rhythm regular or increasing his metoprolol to try and get a little better blood pressure. Assessment & Plan He is mulling over his options as far as another PCP and he will continue to get monthly PT/INRs. Needs to have his blood pressure rechecked in about a month to 6 weeks.           Nolvia White MD

## 2022-03-11 NOTE — TELEPHONE ENCOUNTER
Pt requesting refill,  didn't have refill on the 5mg, but had refill on 7.5. Requested Prescriptions     Pending Prescriptions Disp Refills    warfarin (COUMADIN) 5 mg tablet 30 Tablet 5     Sig: Take 2 pills every third day.

## 2022-03-14 RX ORDER — WARFARIN SODIUM 5 MG/1
TABLET ORAL
Qty: 30 TABLET | Refills: 5 | Status: SHIPPED | OUTPATIENT
Start: 2022-03-14 | End: 2022-03-31

## 2022-03-19 PROBLEM — R05.3 CHRONIC COUGH: Status: ACTIVE | Noted: 2017-07-07

## 2022-03-19 PROBLEM — R10.9 LEFT FLANK PAIN: Status: ACTIVE | Noted: 2020-12-23

## 2022-03-31 ENCOUNTER — OFFICE VISIT (OUTPATIENT)
Dept: FAMILY MEDICINE CLINIC | Age: 67
End: 2022-03-31
Payer: MEDICARE

## 2022-03-31 ENCOUNTER — HOSPITAL ENCOUNTER (OUTPATIENT)
Dept: LAB | Age: 67
Discharge: HOME | End: 2022-03-31
Payer: MEDICARE

## 2022-03-31 VITALS
HEIGHT: 74 IN | OXYGEN SATURATION: 97 % | WEIGHT: 231.1 LBS | TEMPERATURE: 97.7 F | BODY MASS INDEX: 29.66 KG/M2 | DIASTOLIC BLOOD PRESSURE: 92 MMHG | HEART RATE: 68 BPM | SYSTOLIC BLOOD PRESSURE: 151 MMHG

## 2022-03-31 DIAGNOSIS — N40.1 BENIGN PROSTATIC HYPERPLASIA WITH URINARY FREQUENCY: ICD-10-CM

## 2022-03-31 DIAGNOSIS — I10 ESSENTIAL HYPERTENSION: ICD-10-CM

## 2022-03-31 DIAGNOSIS — I10 ESSENTIAL HYPERTENSION: Primary | ICD-10-CM

## 2022-03-31 DIAGNOSIS — E83.110 PRIMARY HEMOCHROMATOSIS (HCC): ICD-10-CM

## 2022-03-31 DIAGNOSIS — R35.0 BENIGN PROSTATIC HYPERPLASIA WITH URINARY FREQUENCY: ICD-10-CM

## 2022-03-31 DIAGNOSIS — I82.409 DEEP VEIN THROMBOSIS (DVT) OF LOWER EXTREMITY, UNSPECIFIED CHRONICITY, UNSPECIFIED LATERALITY, UNSPECIFIED VEIN (HCC): ICD-10-CM

## 2022-03-31 DIAGNOSIS — Z86.718 HISTORY OF DVT (DEEP VEIN THROMBOSIS): ICD-10-CM

## 2022-03-31 LAB
ALBUMIN SERPL-MCNC: 4.4 G/DL (ref 3.5–4.7)
ALBUMIN/GLOB SERPL: 1.8 {RATIO}
ALP SERPL-CCNC: 60 U/L (ref 38–126)
ALT SERPL-CCNC: 23 U/L (ref 3–72)
ANION GAP SERPL CALC-SCNC: 10 MMOL/L
AST SERPL W P-5'-P-CCNC: 26 U/L (ref 17–74)
BASOPHILS # BLD: 0 K/UL (ref 0–0.1)
BASOPHILS NFR BLD: 1 % (ref 0–2)
BILIRUB SERPL-MCNC: 0.6 MG/DL (ref 0.2–1)
BUN SERPL-MCNC: 23 MG/DL (ref 9–21)
BUN/CREAT SERPL: 29
CA-I BLD-MCNC: 9.2 MG/DL (ref 8.5–10.5)
CHLORIDE SERPL-SCNC: 105 MMOL/L (ref 94–111)
CO2 SERPL-SCNC: 24 MMOL/L (ref 21–33)
CREAT SERPL-MCNC: 0.8 MG/DL (ref 0.8–1.5)
DIFFERENTIAL METHOD BLD: ABNORMAL
EOSINOPHIL # BLD: 0.2 K/UL (ref 0–0.4)
EOSINOPHIL NFR BLD: 3 % (ref 0–5)
ERYTHROCYTE [DISTWIDTH] IN BLOOD BY AUTOMATED COUNT: 12.4 % (ref 11.6–14.5)
GLOBULIN SER CALC-MCNC: 2.5 G/DL
GLUCOSE SERPL-MCNC: 96 MG/DL (ref 70–110)
HCT VFR BLD AUTO: 46.4 % (ref 36–48)
HGB BLD-MCNC: 15.6 G/DL (ref 13–16)
IMM GRANULOCYTES # BLD AUTO: 0 K/UL (ref 0–0.04)
IMM GRANULOCYTES NFR BLD AUTO: 0 % (ref 0–0.5)
INR PPP: 2.2 (ref 0.8–1.2)
LYMPHOCYTES # BLD: 2.2 K/UL (ref 0.9–3.6)
LYMPHOCYTES NFR BLD: 34 % (ref 21–52)
MCH RBC QN AUTO: 31.6 PG (ref 24–34)
MCHC RBC AUTO-ENTMCNC: 33.6 G/DL (ref 31–37)
MCV RBC AUTO: 94.1 FL (ref 78–100)
MONOCYTES # BLD: 0.8 K/UL (ref 0.05–1.2)
MONOCYTES NFR BLD: 13 % (ref 3–10)
NEUTS SEG # BLD: 3.1 K/UL (ref 1.8–8)
NEUTS SEG NFR BLD: 49 % (ref 40–73)
NRBC # BLD: 0 K/UL (ref 0–0.01)
NRBC BLD-RTO: 0 PER 100 WBC
PLATELET # BLD AUTO: 205 K/UL (ref 135–420)
PMV BLD AUTO: 11 FL (ref 9.2–11.8)
POTASSIUM SERPL-SCNC: 3.6 MMOL/L (ref 3.2–5.1)
PROT SERPL-MCNC: 6.9 G/DL (ref 6.1–8.4)
PROTHROMBIN TIME: 23.8 SEC (ref 11.5–15.2)
RBC # BLD AUTO: 4.93 M/UL (ref 4.35–5.65)
SODIUM SERPL-SCNC: 139 MMOL/L (ref 135–145)
WBC # BLD AUTO: 6.4 K/UL (ref 4.6–13.2)

## 2022-03-31 PROCEDURE — 85610 PROTHROMBIN TIME: CPT

## 2022-03-31 PROCEDURE — 80061 LIPID PANEL: CPT

## 2022-03-31 PROCEDURE — G8510 SCR DEP NEG, NO PLAN REQD: HCPCS | Performed by: STUDENT IN AN ORGANIZED HEALTH CARE EDUCATION/TRAINING PROGRAM

## 2022-03-31 PROCEDURE — G8755 DIAS BP > OR = 90: HCPCS | Performed by: STUDENT IN AN ORGANIZED HEALTH CARE EDUCATION/TRAINING PROGRAM

## 2022-03-31 PROCEDURE — 3017F COLORECTAL CA SCREEN DOC REV: CPT | Performed by: STUDENT IN AN ORGANIZED HEALTH CARE EDUCATION/TRAINING PROGRAM

## 2022-03-31 PROCEDURE — 80053 COMPREHEN METABOLIC PANEL: CPT

## 2022-03-31 PROCEDURE — 84153 ASSAY OF PSA TOTAL: CPT

## 2022-03-31 PROCEDURE — 99214 OFFICE O/P EST MOD 30 MIN: CPT | Performed by: STUDENT IN AN ORGANIZED HEALTH CARE EDUCATION/TRAINING PROGRAM

## 2022-03-31 PROCEDURE — G8536 NO DOC ELDER MAL SCRN: HCPCS | Performed by: STUDENT IN AN ORGANIZED HEALTH CARE EDUCATION/TRAINING PROGRAM

## 2022-03-31 PROCEDURE — G8753 SYS BP > OR = 140: HCPCS | Performed by: STUDENT IN AN ORGANIZED HEALTH CARE EDUCATION/TRAINING PROGRAM

## 2022-03-31 PROCEDURE — 85025 COMPLETE CBC W/AUTO DIFF WBC: CPT

## 2022-03-31 PROCEDURE — G8417 CALC BMI ABV UP PARAM F/U: HCPCS | Performed by: STUDENT IN AN ORGANIZED HEALTH CARE EDUCATION/TRAINING PROGRAM

## 2022-03-31 PROCEDURE — 1101F PT FALLS ASSESS-DOCD LE1/YR: CPT | Performed by: STUDENT IN AN ORGANIZED HEALTH CARE EDUCATION/TRAINING PROGRAM

## 2022-03-31 PROCEDURE — G8427 DOCREV CUR MEDS BY ELIG CLIN: HCPCS | Performed by: STUDENT IN AN ORGANIZED HEALTH CARE EDUCATION/TRAINING PROGRAM

## 2022-03-31 RX ORDER — AMLODIPINE BESYLATE 5 MG/1
5 TABLET ORAL DAILY
Qty: 90 TABLET | Refills: 0 | Status: SHIPPED | OUTPATIENT
Start: 2022-03-31 | End: 2022-06-12

## 2022-03-31 RX ORDER — WARFARIN 7.5 MG/1
7.5 TABLET ORAL DAILY
Qty: 90 TABLET | Refills: 3 | Status: SHIPPED | OUTPATIENT
Start: 2022-03-31

## 2022-03-31 RX ORDER — TAMSULOSIN HYDROCHLORIDE 0.4 MG/1
0.4 CAPSULE ORAL DAILY
Qty: 90 CAPSULE | Refills: 1 | Status: SHIPPED | OUTPATIENT
Start: 2022-03-31 | End: 2022-09-13

## 2022-03-31 RX ORDER — WARFARIN SODIUM 5 MG/1
TABLET ORAL
Qty: 90 TABLET | Refills: 5 | Status: SHIPPED | OUTPATIENT
Start: 2022-03-31

## 2022-03-31 NOTE — PROGRESS NOTES
Isidoro Carmona. presents today for   Chief Complaint   Patient presents with   [address]       Is someone accompanying this pt? No     Is the patient using any DME equipment during OV? No     Depression Screening:  3 most recent PHQ Screens 3/31/2022   Little interest or pleasure in doing things Not at all   Feeling down, depressed, irritable, or hopeless Not at all   Total Score PHQ 2 0       Learning Assessment:  Learning Assessment 10/5/2020   PRIMARY LEARNER Patient   HIGHEST LEVEL OF EDUCATION - PRIMARY LEARNER  GRADUATED HIGH SCHOOL OR GED   BARRIERS PRIMARY LEARNER NONE   CO-LEARNER CAREGIVER No   PRIMARY LANGUAGE ENGLISH   LEARNER PREFERENCE PRIMARY DEMONSTRATION   ANSWERED BY patient   RELATIONSHIP SELF       Fall Risk  Fall Risk Assessment, last 12 mths 12/17/2021   Able to walk? Yes   Fall in past 12 months? 0   Do you feel unsteady? 0   Are you worried about falling 0       Health Maintenance reviewed and discussed and ordered per Provider. Health Maintenance Due   Topic Date Due    Hepatitis C Screening  Never done    DTaP/Tdap/Td series (1 - Tdap) Never done    Shingrix Vaccine Age 50> (3 of 2) Never done    AAA Screening 73-67 YO Male Smoking Patients  Never done    Pneumococcal 65+ years (1 of 1 - PPSV23) Never done    Flu Vaccine (1) 09/01/2021   . Coordination of Care:    1. \"Have you been to the ER, urgent care clinic since your last visit? Hospitalized since your last visit? \" No    2. \"Have you seen or consulted any other health care providers outside of the [address] [place] [address] since your last visit? \" No     3. For patients aged 39-70: Has the patient had a colonoscopy? Yes - no Care Gap present     If the patient is female:    4. For patients aged 41-77: Has the patient had a mammogram within the past 2 years? NA - based on age/sex    5. For patients aged 21-65: Has the patient had a pap smear?  NA - based on age/sex

## 2022-03-31 NOTE — PROGRESS NOTES
Subjective:   Chalo Castro is a 77 y.o. male who was seen for Establish Care    Patient here for routine follow-up. He has had some urinary frequency but denies any hematuria. No flaccid past.  He does take HCTZ as well. Denies any chest pain or shortness of breath. Currently takes Coumadin 5 mg for 2 days and 7.5 mg on the 3rd day. He is wondering if he can be put on a different blood thinner. Home Medications    Medication Sig Start Date End Date Taking? Authorizing Provider   warfarin (COUMADIN) 7.5 mg tablet Take 1 Tablet by mouth daily. Take one daily 3/31/22  Yes Frances Mina MD   warfarin (COUMADIN) 5 mg tablet Take 2 pills every third day. 3/31/22  Yes Frances Mina MD   amLODIPine (NORVASC) 5 mg tablet Take 1 Tablet by mouth daily. 3/31/22  Yes Frances Mina MD   metoprolol succinate (TOPROL-XL) 100 mg tablet Take 2 Tablets by mouth daily. 12/17/21  Yes Rita Peterson MD   losartan (COZAAR) 100 mg tablet Take 1 Tablet by mouth daily. 12/17/21  Yes Rita Peterson MD   pantoprazole (PROTONIX) 40 mg tablet Take 1 Tablet by mouth daily. 12/17/21  Yes Rita Peterson MD   [facility]) 15 mg tablet Take 1 Tablet by mouth daily. 12/17/21  Yes Rita Peterson MD   sildenafil citrate (VIAGRA) 100 mg tablet TAKE 1 TABLET BY MOUTH ONCE DAILY AS NEEDED FOR 4 DAYS.  3/5/21  Yes Rita Peterson MD   aspirin delayed-release 81 mg tablet Take 81 mg by mouth daily. Yes Provider, Historical      Allergies   Allergen Reactions    Sulfa (Sulfonamide Antibiotics) Other (comments)     Social History     Tobacco Use    Smoking status: Former Smoker     Packs/day: 0.50     Years: 20.00     Pack years: 10.00    Smokeless tobacco: Never Used   Vaping Use    Vaping Use: Never used   Substance Use Topics    Alcohol use: Never    Drug use: Never        Review of Systems   All other systems reviewed and are negative.       Objective:     Visit Vitals  BP (!) 151/92 (BP 1 Location: Left upper arm, BP Patient Position: Sitting, BP Cuff Size: Adult)   Pulse 68   Temp 97.7 °F (36.5 °C) (Oral)   Ht 6' 2\" (1.88 m)   Wt 231 lb 1.6 oz (104.8 kg)   SpO2 97%   BMI 29.67 kg/m²        General: alert, oriented, not in distress  Head: scalp normal, atraumatic  Eyes: pupils are equal and reactive, full and intact EOM's  Ears: patent ear canal, intact tympanic membrane  Nose: normal turbinates, no congestion or discharge  Lips/Mouth: moist lips and buccal mucosa, non-enlarged tonsils, pink throat  Neck: supple, no JVD, no lymphadenopathy, non-palpable thyroid  Chest/Lungs: clear breath sounds, no wheezing or crackles  Heart: normal rate, regular rhythm, no murmur  Abdomen: soft, non-distended, non-tender, normal bowel sounds, no organomegaly, no masses  Extremities: no focal deformities, no edema  Skin: no active skin lesions  Rectal: Enlarged prostate, no nodules palpated    Assessment & Plan:     1. Primary hemochromatosis (HCC)  Check iron panel and ferritin. Will obtain liver ultrasound  - CBC WITH AUTOMATED DIFF; Future  - METABOLIC PANEL, COMPREHENSIVE; Future  - IRON PROFILE  - FERRITIN  - US LIVER; Future    2. Essential hypertension  Continue Norvasc, metoprolol, losartan  - LIPID PANEL; Future    3. History of DVT (deep vein thrombosis)  INR 2.2. Continue Coumadin at 5mg, 5mg, 7.5mg cycles. Patient will call his insurance to see if Eliquis or Xarelto will be covered. If they are, we will switch to NOAC. - warfarin (COUMADIN) 7.5 mg tablet; Take 1 Tablet by mouth daily. Take one daily  Dispense: 90 Tablet; Refill: 3    4. Benign prostatic hyperplasia with urinary frequency  We will start Flomax. Check PSA  - PSA, DIAGNOSTIC (PROSTATE SPECIFIC AG)             I have discussed the diagnosis with the patient and the intended plan as seen in the above orders. The patient has received an after-visit summary and questions were answered concerning future plans.   I have discussed medication side effects and warnings with the patient as well. I have reviewed the plan of care with the patient, accepted their input and they are in agreement with the treatment goals. Previous lab and imaging results were reviewed by me.        Jason Jarrell MD  March 31, 2022

## 2022-04-01 LAB
CHOLEST SERPL-MCNC: 168 MG/DL
HDLC SERPL-MCNC: 40 MG/DL (ref 40–60)
HDLC SERPL: 4.2 {RATIO} (ref 0–5)
LDLC SERPL CALC-MCNC: 98.2 MG/DL (ref 0–100)
LIPID PROFILE,FLP: NORMAL
PSA SERPL-MCNC: 0.7 NG/ML (ref 0–4)
TRIGL SERPL-MCNC: 149 MG/DL (ref ?–150)
VLDLC SERPL CALC-MCNC: 29.8 MG/DL

## 2022-04-14 ENCOUNTER — OFFICE VISIT (OUTPATIENT)
Dept: FAMILY MEDICINE CLINIC | Age: 67
End: 2022-04-14
Payer: MEDICARE

## 2022-04-14 VITALS
OXYGEN SATURATION: 96 % | WEIGHT: 231 LBS | HEART RATE: 64 BPM | DIASTOLIC BLOOD PRESSURE: 81 MMHG | SYSTOLIC BLOOD PRESSURE: 128 MMHG | BODY MASS INDEX: 29.65 KG/M2 | TEMPERATURE: 97.6 F | HEIGHT: 74 IN

## 2022-04-14 DIAGNOSIS — I10 ESSENTIAL HYPERTENSION: ICD-10-CM

## 2022-04-14 DIAGNOSIS — R35.0 BENIGN PROSTATIC HYPERPLASIA WITH URINARY FREQUENCY: ICD-10-CM

## 2022-04-14 DIAGNOSIS — N40.1 BENIGN PROSTATIC HYPERPLASIA WITH URINARY FREQUENCY: ICD-10-CM

## 2022-04-14 DIAGNOSIS — I82.409 DEEP VEIN THROMBOSIS (DVT) OF LOWER EXTREMITY, UNSPECIFIED CHRONICITY, UNSPECIFIED LATERALITY, UNSPECIFIED VEIN (HCC): Primary | ICD-10-CM

## 2022-04-14 PROCEDURE — 1101F PT FALLS ASSESS-DOCD LE1/YR: CPT | Performed by: STUDENT IN AN ORGANIZED HEALTH CARE EDUCATION/TRAINING PROGRAM

## 2022-04-14 PROCEDURE — G8754 DIAS BP LESS 90: HCPCS | Performed by: STUDENT IN AN ORGANIZED HEALTH CARE EDUCATION/TRAINING PROGRAM

## 2022-04-14 PROCEDURE — 3017F COLORECTAL CA SCREEN DOC REV: CPT | Performed by: STUDENT IN AN ORGANIZED HEALTH CARE EDUCATION/TRAINING PROGRAM

## 2022-04-14 PROCEDURE — G8427 DOCREV CUR MEDS BY ELIG CLIN: HCPCS | Performed by: STUDENT IN AN ORGANIZED HEALTH CARE EDUCATION/TRAINING PROGRAM

## 2022-04-14 PROCEDURE — G8752 SYS BP LESS 140: HCPCS | Performed by: STUDENT IN AN ORGANIZED HEALTH CARE EDUCATION/TRAINING PROGRAM

## 2022-04-14 PROCEDURE — G8510 SCR DEP NEG, NO PLAN REQD: HCPCS | Performed by: STUDENT IN AN ORGANIZED HEALTH CARE EDUCATION/TRAINING PROGRAM

## 2022-04-14 PROCEDURE — G8417 CALC BMI ABV UP PARAM F/U: HCPCS | Performed by: STUDENT IN AN ORGANIZED HEALTH CARE EDUCATION/TRAINING PROGRAM

## 2022-04-14 PROCEDURE — 99214 OFFICE O/P EST MOD 30 MIN: CPT | Performed by: STUDENT IN AN ORGANIZED HEALTH CARE EDUCATION/TRAINING PROGRAM

## 2022-04-14 PROCEDURE — G8536 NO DOC ELDER MAL SCRN: HCPCS | Performed by: STUDENT IN AN ORGANIZED HEALTH CARE EDUCATION/TRAINING PROGRAM

## 2022-04-14 NOTE — PROGRESS NOTES
Megha Del Rio. presents today for   Chief Complaint   Patient presents with    Follow-up     prostate/ blood pressure       Is someone accompanying this pt? No     Is the patient using any DME equipment during OV? No     Depression Screening:  3 most recent PHQ Screens 4/14/2022   Little interest or pleasure in doing things Not at all   Feeling down, depressed, irritable, or hopeless Not at all   Total Score PHQ 2 0       Learning Assessment:  Learning Assessment 10/5/2020   PRIMARY LEARNER Patient   HIGHEST LEVEL OF EDUCATION - PRIMARY LEARNER  GRADUATED HIGH SCHOOL OR GED   BARRIERS PRIMARY LEARNER NONE   CO-LEARNER CAREGIVER No   PRIMARY LANGUAGE ENGLISH   LEARNER PREFERENCE PRIMARY DEMONSTRATION   ANSWERED BY patient   RELATIONSHIP SELF       Fall Risk  Fall Risk Assessment, last 12 mths 12/17/2021   Able to walk? Yes   Fall in past 12 months? 0   Do you feel unsteady? 0   Are you worried about falling 0       Health Maintenance reviewed and discussed and ordered per Provider. Health Maintenance Due   Topic Date Due    Hepatitis C Screening  Never done    DTaP/Tdap/Td series (1 - Tdap) Never done    Shingrix Vaccine Age 50> (3 of 2) Never done    AAA Screening 73-69 YO Male Smoking Patients  Never done    Pneumococcal 65+ years (1 - PCV) Never done   . Coordination of Care:    1. \"Have you been to the ER, urgent care clinic since your last visit? Hospitalized since your last visit? \" No    2. \"Have you seen or consulted any other health care providers outside of the [address] [place] [address] since your last visit? \" No     3. For patients aged 39-70: Has the patient had a colonoscopy? Yes - no Care Gap present     If the patient is female:    4. For patients aged 41-77: Has the patient had a mammogram within the past 2 years? NA - based on age/sex    5. For patients aged 21-65: Has the patient had a pap smear?  NA - based on age/sex

## 2022-04-19 NOTE — PROGRESS NOTES
Subjective:   John Johnston is a 77 y.o. male who was seen for Follow-up (prostate/ blood pressure)    Patient here for BP follow up. BP well controlled. Was also started on flomax and stated that his urinary flow has improved. No recent falls. Home Medications    Medication Sig Start Date End Date Taking? Authorizing Provider   warfarin (COUMADIN) 7.5 mg tablet Take 1 Tablet by mouth daily. Take one daily 3/31/22  Yes Melodie Cameron MD   warfarin (COUMADIN) 5 mg tablet Take 2 pills every third day. 3/31/22  Yes Melodie Cameron MD   amLODIPine (NORVASC) 5 mg tablet Take 1 Tablet by mouth daily. 3/31/22  Yes Melodie Cameron MD   tamsulosin (Flomax) 0.4 mg capsule Take 1 Capsule by mouth daily. 3/31/22  Yes Melodie Cameron MD   metoprolol succinate (TOPROL-XL) 100 mg tablet Take 2 Tablets by mouth daily. 12/17/21  Yes Arley Peterson, MD   losartan (COZAAR) 100 mg tablet Take 1 Tablet by mouth daily. 12/17/21  Yes Arley Peterson MD   pantoprazole (PROTONIX) 40 mg tablet Take 1 Tablet by mouth daily. 12/17/21  Yes Arley Peterson., MD   meloxicam NATHALIA WATSON [facility]) 15 mg tablet Take 1 Tablet by mouth daily. 12/17/21  Yes Arley Peterson MD   sildenafil citrate (VIAGRA) 100 mg tablet TAKE 1 TABLET BY MOUTH ONCE DAILY AS NEEDED FOR 4 DAYS.  3/5/21  Yes Arley Peterson MD   aspirin delayed-release 81 mg tablet Take 81 mg by mouth daily. Yes Provider, Historical      Allergies   Allergen Reactions    Sulfa (Sulfonamide Antibiotics) Other (comments)     Social History     Tobacco Use    Smoking status: Former Smoker     Packs/day: 0.50     Years: 20.00     Pack years: 10.00    Smokeless tobacco: Never Used   Vaping Use    Vaping Use: Never used   Substance Use Topics    Alcohol use: Never    Drug use: Never        Review of Systems   All other systems reviewed and are negative.       Objective:     Visit Vitals  /81 (BP 1 Location: Left upper arm, BP Patient Position: Sitting, BP Cuff Size: Adult)   Pulse 64   Temp 97.6 °F (36.4 °C) (Temporal)   Ht 6' 2\" (1.88 m)   Wt 231 lb (104.8 kg)   SpO2 96%   BMI 29.66 kg/m²        General: alert, oriented, not in distress  Head: scalp normal, atraumatic  Eyes: pupils are equal and reactive, full and intact EOM's  Ears: patent ear canal, intact tympanic membrane  Nose: normal turbinates, no congestion or discharge  Lips/Mouth: moist lips and buccal mucosa, non-enlarged tonsils, pink throat  Neck: supple, no JVD, no lymphadenopathy, non-palpable thyroid  Chest/Lungs: clear breath sounds, no wheezing or crackles  Heart: normal rate, regular rhythm, no murmur  Abdomen: soft, non-distended, non-tender, normal bowel sounds, no organomegaly, no masses  Extremities: no focal deformities, no edema  Skin: no active skin lesions      Assessment & Plan:     1. Deep vein thrombosis (DVT) of lower extremity, unspecified chronicity, unspecified laterality, unspecified vein (HCC)  INR at 2.2. Continue coumadin. 2. Essential hypertension  Continue norvasc, metoprolol, losartan    3. Benign prostatic hyperplasia with urinary frequency  Continue flomax        I have discussed the diagnosis with the patient and the intended plan as seen in the above orders. The patient has received an after-visit summary and questions were answered concerning future plans. I have discussed medication side effects and warnings with the patient as well. I have reviewed the plan of care with the patient, accepted their input and they are in agreement with the treatment goals. Previous lab and imaging results were reviewed by me.        Andrew Faulkner MD  April 19, 2022

## 2022-05-02 ENCOUNTER — HOSPITAL ENCOUNTER (OUTPATIENT)
Dept: ULTRASOUND IMAGING | Age: 67
Discharge: HOME | End: 2022-05-02
Attending: STUDENT IN AN ORGANIZED HEALTH CARE EDUCATION/TRAINING PROGRAM
Payer: MEDICARE

## 2022-05-02 DIAGNOSIS — E83.110 PRIMARY HEMOCHROMATOSIS (HCC): ICD-10-CM

## 2022-05-02 PROCEDURE — 76705 ECHO EXAM OF ABDOMEN: CPT

## 2022-06-02 ENCOUNTER — HOSPITAL ENCOUNTER (OUTPATIENT)
Dept: LAB | Age: 67
Discharge: HOME | End: 2022-06-02
Payer: MEDICARE

## 2022-06-02 DIAGNOSIS — I82.409 DEEP VEIN THROMBOSIS (DVT) OF LOWER EXTREMITY, UNSPECIFIED CHRONICITY, UNSPECIFIED LATERALITY, UNSPECIFIED VEIN (HCC): ICD-10-CM

## 2022-06-02 LAB
INR PPP: 2.5 (ref 0.8–1.2)
PROTHROMBIN TIME: 27.3 SEC (ref 11.5–15.2)

## 2022-06-02 PROCEDURE — 85610 PROTHROMBIN TIME: CPT

## 2022-06-02 PROCEDURE — 36415 COLL VENOUS BLD VENIPUNCTURE: CPT

## 2022-06-12 RX ORDER — AMLODIPINE BESYLATE 5 MG/1
5 TABLET ORAL DAILY
Qty: 90 TABLET | Refills: 0 | Status: SHIPPED | OUTPATIENT
Start: 2022-06-12 | End: 2022-09-01

## 2022-06-22 ENCOUNTER — HOSPITAL ENCOUNTER (OUTPATIENT)
Age: 67
Discharge: HOME | End: 2022-06-22
Attending: OPHTHALMOLOGY | Admitting: OPHTHALMOLOGY
Payer: MEDICARE

## 2022-06-22 ENCOUNTER — ANESTHESIA (OUTPATIENT)
Dept: SURGERY | Age: 67
End: 2022-06-22
Payer: MEDICARE

## 2022-06-22 ENCOUNTER — ANESTHESIA EVENT (OUTPATIENT)
Dept: SURGERY | Age: 67
End: 2022-06-22
Payer: MEDICARE

## 2022-06-22 VITALS
DIASTOLIC BLOOD PRESSURE: 67 MMHG | RESPIRATION RATE: 14 BRPM | OXYGEN SATURATION: 95 % | SYSTOLIC BLOOD PRESSURE: 129 MMHG | TEMPERATURE: 98 F | BODY MASS INDEX: 27.86 KG/M2 | HEART RATE: 56 BPM | WEIGHT: 217 LBS

## 2022-06-22 PROBLEM — H26.9 CATARACT: Status: ACTIVE | Noted: 2022-06-22

## 2022-06-22 PROCEDURE — 76060000032 HC ANESTHESIA 0.5 TO 1 HR: Performed by: OPHTHALMOLOGY

## 2022-06-22 PROCEDURE — 74011250636 HC RX REV CODE- 250/636: Performed by: NURSE ANESTHETIST, CERTIFIED REGISTERED

## 2022-06-22 PROCEDURE — 74011250636 HC RX REV CODE- 250/636: Performed by: OPHTHALMOLOGY

## 2022-06-22 PROCEDURE — 74011000636 HC RX REV CODE- 636: Performed by: OPHTHALMOLOGY

## 2022-06-22 PROCEDURE — 74011000250 HC RX REV CODE- 250: Performed by: OPHTHALMOLOGY

## 2022-06-22 PROCEDURE — 77030020272 HC MISC IMPL LENS: Performed by: OPHTHALMOLOGY

## 2022-06-22 PROCEDURE — V2632 POST CHMBR INTRAOCULAR LENS: HCPCS | Performed by: OPHTHALMOLOGY

## 2022-06-22 PROCEDURE — V2788 PRESBYOPIA-CORRECT FUNCTION: HCPCS | Performed by: OPHTHALMOLOGY

## 2022-06-22 PROCEDURE — 76210000020 HC REC RM PH II FIRST 0.5 HR: Performed by: OPHTHALMOLOGY

## 2022-06-22 PROCEDURE — 2709999900 HC NON-CHARGEABLE SUPPLY: Performed by: OPHTHALMOLOGY

## 2022-06-22 PROCEDURE — 76010000138 HC OR TIME 0.5 TO 1 HR: Performed by: OPHTHALMOLOGY

## 2022-06-22 RX ORDER — SODIUM CHLORIDE 0.9 % (FLUSH) 0.9 %
5-40 SYRINGE (ML) INJECTION AS NEEDED
Status: DISCONTINUED | OUTPATIENT
Start: 2022-06-22 | End: 2022-06-22 | Stop reason: HOSPADM

## 2022-06-22 RX ORDER — MOXIFLOXACIN 5 MG/ML
SOLUTION/ DROPS OPHTHALMIC AS NEEDED
Status: DISCONTINUED | OUTPATIENT
Start: 2022-06-22 | End: 2022-06-22 | Stop reason: HOSPADM

## 2022-06-22 RX ORDER — EPINEPHRINE 1 MG/ML
INJECTION, SOLUTION, CONCENTRATE INTRAVENOUS AS NEEDED
Status: DISCONTINUED | OUTPATIENT
Start: 2022-06-22 | End: 2022-06-22 | Stop reason: HOSPADM

## 2022-06-22 RX ORDER — MIDAZOLAM HYDROCHLORIDE 1 MG/ML
INJECTION, SOLUTION INTRAMUSCULAR; INTRAVENOUS AS NEEDED
Status: DISCONTINUED | OUTPATIENT
Start: 2022-06-22 | End: 2022-06-22 | Stop reason: HOSPADM

## 2022-06-22 RX ORDER — TETRACAINE HYDROCHLORIDE 5 MG/ML
SOLUTION OPHTHALMIC AS NEEDED
Status: DISCONTINUED | OUTPATIENT
Start: 2022-06-22 | End: 2022-06-22 | Stop reason: HOSPADM

## 2022-06-22 RX ADMIN — MIDAZOLAM HYDROCHLORIDE 2 MG: 2 INJECTION, SOLUTION INTRAMUSCULAR; INTRAVENOUS at 08:43

## 2022-06-22 RX ADMIN — Medication 1 EACH: at 07:31

## 2022-06-22 NOTE — DISCHARGE INSTRUCTIONS
POST-OPERATIVE INSTRUCTIONS FOR CATARACT SURGERY     1. No heavy lifting (no more than 5 pounds). No Bending at waist and No strenuous activity for one (1) week. 2. DO NOT RUB YOUR EYE!!! Wear eye protection at all times when going outdoors (glasses, sunglasses,        ect) and wear shield while sleeping/napping for the first week after surgery. 3. DO NOT GET WATER IN YOUR EYES FOR THE NEXT 3 DAYS. You may gently clean your face and you        may shower, but don't put any pressure on the eye. Ladies, no eye makeup for one (1) week after surgery. Do not swim or get into a hot tub for three (3) weeks. 4. You may experience eye irritation, aching, itching and blurred vision for several days. Use Tylenol for         Discomfort but DO NOT RUB YOUR EYE . 5. Call your doctor with any increased pain, redness, nausea, vomiting, or worsening vision. Also call your doctor        with new flashes of light, many new floaters or a curtain/veil coming into your vision.                                                8 Valorie De La Cruzidi YOUR HANDS BEFORE PUTTING IN YOUR DROPS                                       ALLOW 5 MINUTES BETWEEN DIFFERENT DROPS                       IF YOU HAVE ANY QUESTION OR CONCERNS DURING OFFICE HOUR                      CALL [phone] OR [phone] AFTER HOURS OR ON WEEKENDS

## 2022-06-22 NOTE — H&P
SEE original H&P Scanned into chart. Date of Surgery Update:  Dillon Stevenson was seen and examined. History and physical has been reviewed. The patient has been examined.  There have been no significant clinical changes since the completion of the originally dated History and Physical.     Signed By: Paz Blankenship MD   06/22/22, 8:20 AM

## 2022-06-22 NOTE — ANESTHESIA POSTPROCEDURE EVALUATION
Procedure(s):  PHACO WITH IOL OS.     MAC    Anesthesia Post Evaluation      Multimodal analgesia: multimodal analgesia not used between 6 hours prior to anesthesia start to PACU discharge  Patient location during evaluation: bedside  Patient participation: complete - patient participated  Level of consciousness: awake and alert  Pain management: adequate  Airway patency: patent  Anesthetic complications: no  Cardiovascular status: acceptable and stable  Respiratory status: acceptable, spontaneous ventilation and room air  Hydration status: acceptable  Post anesthesia nausea and vomiting:  none  Final Post Anesthesia Temperature Assessment:  Normothermia (36.0-37.5 degrees C)      INITIAL Post-op Vital signs:   Vitals Value Taken Time   /78 06/22/22 0907   Temp 36.7 °C (98 °F) 06/22/22 0907   Pulse 57 06/22/22 0907   Resp 16 06/22/22 0907   SpO2 98 % 06/22/22 0907

## 2022-06-22 NOTE — OP NOTES
OPERATIVE REPORT     PATIENT INFORMATION:    Patient: Candi Everett MRN: [id number]  SSN: [id number]    YOB: 1955  Age: 77 y.o. Sex: male        LOCATION OF SURGERY: [address] OF SURGERY:  06/22/22      PRE-OPERATIVE DIAGNOSIS: Cataract Left eye. POST OPERATIVE DIAGNOSIS: Cataract Left eye. PROCEDURE PERFORMED: Phacoemulsification with intraocular lens Left eye. SURGEON: Raul Oakley M.D. ANESTHESIA: Monitored anesthesia. COMPLICATIONS: None. INDICATIONS FOR PROCEDURE:   This is a 77y.o. year old male with progressively decreasing vision in the left eye. Risks, benefits and alternatives of surgery were explained at length with the patient and informed consent was obtained. PROCEDURE:   The patient was met in the pre-operative holding area where confirmation was made that the left eye was to be operated on and surgical eye was marked. The patient was taken to the operating room where anesthesia staff was present to give temporary sedation. Following the instillation of topical tetracaine drops to the operative eye the patient was then prepped and draped in the usual sterile fashion for ophthalmic surgery. The lid speculum was placed and the lids were retracted. A paracentesis was made through the clear cornea, shugarcaine was injected in the eye for improved dilation. The anterior chamber was deepened with viscoelastic material. A 2.4 mm keratome was used to make a self-sealing clear corneal incision. Capsulorrhexis was initiated with a cystotome and completed with Utrata forceps without difficulty. Hydrodissection was completed and good fluid wave was visualized. Phacoemulsification was initiated and completed in a divide and conquer fashion without difficulty.  Irrigation and aspiration was used to remove the residual cortical material from the capsular bag and then the capsular bag and the anterior chamber were deepened with viscoelastic material. The bag was inspected and found to be free of any tears or defects. A GEV175 24.0 Diopter intraocular lens was placed into the capsular bag with good centration @ 85 degrees. Irrigation and aspiration was used to remove the residual viscoelastic material from the capsular bag and the anterior chamber and the incision sites were hydrated the BSS and cannula. The incisions were rehydrated and inspected with a weck-nathan sponge and were found to be water tight. The eye was inspected and the anterior chamber was deep, the pupil round, and the lens was in good position. The lid speculum was then removed under visualization. Several drops of antibiotic eye drops were instilled into patient's operated eye. The operated eye was then covered with an eyeshield. The patient tolerated the procedure well and was transferred to recovery in good condition.        Yang Wright MD  06/22/22 12:39 PM

## 2022-06-22 NOTE — ANESTHESIA PREPROCEDURE EVALUATION
Relevant Problems   CARDIOVASCULAR   (+) Essential hypertension       Anesthetic History   No history of anesthetic complications            Review of Systems / Medical History  Patient summary reviewed, nursing notes reviewed and pertinent labs reviewed    Pulmonary  Within defined limits                 Neuro/Psych   Within defined limits           Cardiovascular    Hypertension        Dysrhythmias       Exercise tolerance: >4 METS     GI/Hepatic/Renal  Within defined limits              Endo/Other  Within defined limits           Other Findings              Physical Exam    Airway  Mallampati: II  TM Distance: 4 - 6 cm  Neck ROM: normal range of motion   Mouth opening: Normal     Cardiovascular  Regular rate and rhythm,  S1 and S2 normal,  no murmur, click, rub, or gallop  Rhythm: regular  Rate: normal         Dental  No notable dental hx       Pulmonary  Breath sounds clear to auscultation               Abdominal  GI exam deferred       Other Findings            Anesthetic Plan    ASA: 3  Anesthesia type: MAC          Induction: Intravenous  Anesthetic plan and risks discussed with: Patient

## 2022-07-05 ENCOUNTER — HOSPITAL ENCOUNTER (OUTPATIENT)
Dept: LAB | Age: 67
Discharge: HOME | End: 2022-07-05
Payer: MEDICARE

## 2022-07-05 DIAGNOSIS — I82.409 DEEP VEIN THROMBOSIS (DVT) OF LOWER EXTREMITY, UNSPECIFIED CHRONICITY, UNSPECIFIED LATERALITY, UNSPECIFIED VEIN (HCC): ICD-10-CM

## 2022-07-05 LAB
INR PPP: 2.6 (ref 0.8–1.2)
PROTHROMBIN TIME: 28.1 SEC (ref 11.5–15.2)

## 2022-07-05 PROCEDURE — 85610 PROTHROMBIN TIME: CPT

## 2022-07-05 PROCEDURE — 36415 COLL VENOUS BLD VENIPUNCTURE: CPT

## 2022-09-01 RX ORDER — AMLODIPINE BESYLATE 5 MG/1
5 TABLET ORAL DAILY
Qty: 90 TABLET | Refills: 0 | Status: SHIPPED | OUTPATIENT
Start: 2022-09-01

## 2022-09-02 ENCOUNTER — HOSPITAL ENCOUNTER (OUTPATIENT)
Dept: LAB | Age: 67
Discharge: HOME | End: 2022-09-02
Payer: MEDICARE

## 2022-09-02 DIAGNOSIS — I82.409 DEEP VEIN THROMBOSIS (DVT) OF LOWER EXTREMITY, UNSPECIFIED CHRONICITY, UNSPECIFIED LATERALITY, UNSPECIFIED VEIN (HCC): ICD-10-CM

## 2022-09-02 LAB
INR PPP: 2 (ref 0.8–1.2)
PROTHROMBIN TIME: 22.7 SEC (ref 11.5–15.2)

## 2022-09-02 PROCEDURE — 85610 PROTHROMBIN TIME: CPT

## 2022-09-02 PROCEDURE — 36415 COLL VENOUS BLD VENIPUNCTURE: CPT

## 2022-09-13 RX ORDER — TAMSULOSIN HYDROCHLORIDE 0.4 MG/1
0.4 CAPSULE ORAL DAILY
Qty: 90 CAPSULE | Refills: 1 | Status: SHIPPED | OUTPATIENT
Start: 2022-09-13 | End: 2022-10-14

## 2022-10-14 ENCOUNTER — OFFICE VISIT (OUTPATIENT)
Dept: FAMILY MEDICINE CLINIC | Age: 67
End: 2022-10-14
Payer: MEDICARE

## 2022-10-14 VITALS
OXYGEN SATURATION: 97 % | HEIGHT: 74 IN | BODY MASS INDEX: 27.85 KG/M2 | WEIGHT: 217 LBS | DIASTOLIC BLOOD PRESSURE: 86 MMHG | HEART RATE: 61 BPM | RESPIRATION RATE: 19 BRPM | SYSTOLIC BLOOD PRESSURE: 126 MMHG

## 2022-10-14 DIAGNOSIS — I26.99 PULMONARY EMBOLISM, UNSPECIFIED CHRONICITY, UNSPECIFIED PULMONARY EMBOLISM TYPE, UNSPECIFIED WHETHER ACUTE COR PULMONALE PRESENT (HCC): ICD-10-CM

## 2022-10-14 DIAGNOSIS — I10 ESSENTIAL HYPERTENSION: Primary | ICD-10-CM

## 2022-10-14 DIAGNOSIS — E83.110 PRIMARY HEMOCHROMATOSIS (HCC): ICD-10-CM

## 2022-10-14 DIAGNOSIS — J30.9 ALLERGIC RHINITIS, UNSPECIFIED SEASONALITY, UNSPECIFIED TRIGGER: ICD-10-CM

## 2022-10-14 DIAGNOSIS — R35.0 BENIGN PROSTATIC HYPERPLASIA WITH URINARY FREQUENCY: ICD-10-CM

## 2022-10-14 DIAGNOSIS — N40.1 BENIGN PROSTATIC HYPERPLASIA WITH URINARY FREQUENCY: ICD-10-CM

## 2022-10-14 PROCEDURE — 99214 OFFICE O/P EST MOD 30 MIN: CPT | Performed by: STUDENT IN AN ORGANIZED HEALTH CARE EDUCATION/TRAINING PROGRAM

## 2022-10-14 PROCEDURE — 1123F ACP DISCUSS/DSCN MKR DOCD: CPT | Performed by: STUDENT IN AN ORGANIZED HEALTH CARE EDUCATION/TRAINING PROGRAM

## 2022-10-14 RX ORDER — LANOLIN ALCOHOL/MO/W.PET/CERES
1000 CREAM (GRAM) TOPICAL DAILY
COMMUNITY

## 2022-10-14 RX ORDER — TAMSULOSIN HYDROCHLORIDE 0.4 MG/1
0.8 CAPSULE ORAL DAILY
Qty: 180 CAPSULE | Refills: 1 | Status: SHIPPED | OUTPATIENT
Start: 2022-10-14

## 2022-10-14 RX ORDER — LORATADINE 10 MG/1
10 TABLET ORAL
COMMUNITY

## 2022-10-14 NOTE — PROGRESS NOTES
Subjective:   Josse De Leon is a 79 y.o. male who was seen for Follow-up (6m follow up/High bp and frequent urination)    HTN: reports he does not eat salt. Compliant with amlodipine 5 mg, losartan 100 mg, metoprolol 200 mg daily    Osteoarthritis: takes meloxicam 15 mg daily. Allergic rhinitis: takes claritin daily. Has had multiple sinus surgeries and had allergy shots in the past.    Reports h/o hemochromatosis and has had multiple DVT/ PE in the past. Gets phlebotomy annually. Follows by Dr. Tatyana Porras in [place]. Reports he has had urinary frequency. Reports this is relatively new. Reports that he wakes up 5 times per night. Patient is taking tamsulosin 0.4 mg daily. Does not feel like this is changed anything. Home Medications    Medication Sig Start Date End Date Taking? Authorizing Provider   tamsulosin (FLOMAX) 0.4 mg capsule TAKE 1 CAPSULE BY MOUTH DAILY. 9/13/22  Yes Phillip Khan MD   amLODIPine (NORVASC) 5 mg tablet TAKE 1 TABLET BY MOUTH DAILY. 9/1/22  Yes Naz COOK NP   warfarin (COUMADIN) 7.5 mg tablet Take 1 Tablet by mouth daily. Take one daily 3/31/22  Yes Phillip Khan MD   warfarin (COUMADIN) 5 mg tablet Take 2 pills every third day. 3/31/22  Yes Phillip Khan MD   metoprolol succinate (TOPROL-XL) 100 mg tablet Take 2 Tablets by mouth daily. 12/17/21  Yes Arnold Peterson MD   losartan (COZAAR) 100 mg tablet Take 1 Tablet by mouth daily. 12/17/21  Yes Arnold Peterson MD   pantoprazole (PROTONIX) 40 mg tablet Take 1 Tablet by mouth daily. 12/17/21  Yes Arnold Peterson MD   meloxicam NATHALIA WATSON [facility]) 15 mg tablet Take 1 Tablet by mouth daily. 12/17/21  Yes Arnold Peterson MD   sildenafil citrate (VIAGRA) 100 mg tablet TAKE 1 TABLET BY MOUTH ONCE DAILY AS NEEDED FOR 4 DAYS.  3/5/21  Yes Arnold Peterson MD   aspirin delayed-release 81 mg tablet Take 81 mg by mouth daily.    Yes Provider, Historical      Allergies   Allergen Reactions    Sulfa (Sulfonamide Antibiotics) Other (comments)     Social History     Tobacco Use    Smoking status: Former     Packs/day: 0.50     Years: 20.00     Pack years: 10.00     Types: Cigarettes    Smokeless tobacco: Never   Vaping Use    Vaping Use: Never used   Substance Use Topics    Alcohol use: Never    Drug use: Never        Review of Systems   As noted above in HPI. Objective:   Visit Vitals  /86   Pulse 61   Resp 19   Ht 6' 2\" (1.88 m)   Wt 217 lb (98.4 kg)   SpO2 97%   BMI 27.86 kg/m²        General: alert, oriented, not in distress  Chest/Lungs: clear breath sounds, no wheezing or crackles  Heart: normal rate, regular rhythm, no murmur  Extremities: no focal deformities, no edema  Skin: no active skin lesions      Assessment & Plan:     1. Essential hypertension  Controlled. Continue amlodipine 5 mg daily, losartan 100 mg daily. Continue to recommend DASH diet and getting at least 150 minutes of moderate intensity activity a week    2. Allergic rhinitis, unspecified seasonality, unspecified trigger  Controlled. Take Claritin 10 mg daily. 3. Benign prostatic hyperplasia with urinary frequency  Patient with increased urinary frequency. Discussed alternative diagnoses to include diabetes and UTI. Patient has no other symptoms of these conditions. He does have a history of BPH. Increase tamsulosin to 0.8 mg daily. If this does not help we will consider alternative diagnoses. - tamsulosin (FLOMAX) 0.4 mg capsule; Take 2 Capsules by mouth daily. Dispense: 180 Capsule; Refill: 1    4. Primary hemochromatosis ([facility] [address]  Patient with a history of hemochromatosis. Follows with hematologist and gets annual phlebotomy. Most recent CBC showed normal hemoglobin hematocrit. 5. Pulmonary embolism, unspecified chronicity, unspecified pulmonary embolism type, unspecified whether acute cor pulmonale present [facility])  Patient with history of multiple pulmonary emboli and DVT. Supposed to be on lifelong anticoagulation. Discussed alternatives to warfarin to include Eliquis and Xarelto. Cost has been an issue in the past.  We will provide information on medication medication assistance program.  Would recommend patient call number provided to see if he would qualify for medication assistance on either Eliquis or Xarelto. Patient's recent INR was 2 which is at goal between 2 and 3. Gets INR checked monthly. I have discussed the diagnosis with the patient and the intended plan as seen in the above orders. The patient has received an after-visit summary and questions were answered concerning future plans. I have discussed medication side effects and warnings with the patient as well. I have reviewed the plan of care with the patient, accepted their input and they are in agreement with the treatment goals. Previous lab and imaging results were reviewed by me.        Radha Cheung MD  October 14, 2022

## 2022-10-14 NOTE — PROGRESS NOTES
Marylene Eriksson. presents today for   Chief Complaint   Patient presents with    Follow-up     6m follow up  High bp and frequent urination       Is someone accompanying this pt? no    Is the patient using any DME equipment during OV? No    Depression Screening:  3 most recent PHQ Screens 10/14/2022   Little interest or pleasure in doing things Not at all   Feeling down, depressed, irritable, or hopeless Not at all   Total Score PHQ 2 0       Learning Assessment:  Learning Assessment 10/5/2020   PRIMARY LEARNER Patient   HIGHEST LEVEL OF EDUCATION - PRIMARY LEARNER  GRADUATED HIGH SCHOOL OR GED   BARRIERS PRIMARY LEARNER NONE   CO-LEARNER CAREGIVER No   PRIMARY LANGUAGE ENGLISH   LEARNER PREFERENCE PRIMARY DEMONSTRATION   ANSWERED BY patient   RELATIONSHIP SELF       Fall Risk  Fall Risk Assessment, last 12 mths 10/14/2022   Able to walk? Yes   Fall in past 12 months? 0   Do you feel unsteady? 0   Are you worried about falling 0       ADL  ADL Assessment 11/2/2021   Feeding yourself No Help Needed   Getting from bed to chair No Help Needed   Getting dressed No Help Needed   Bathing or showering No Help Needed   Walk across the room (includes cane/walker) No Help Needed   Using the telphone No Help Needed   Taking your medications No Help Needed   Preparing meals No Help Needed   Managing money (expenses/bills) No Help Needed   Moderately strenuous housework (laundry) No Help Needed   Shopping for personal items (toiletries/medicines) No Help Needed   Shopping for groceries No Help Needed   Driving No Help Needed   Climbing a flight of stairs No Help Needed   Getting to places beyond walking distances No Help Needed       Health Maintenance reviewed and discussed and ordered per Provider.     Health Maintenance Due   Topic Date Due    Hepatitis C Screening  Never done    DTaP/Tdap/Td series (1 - Tdap) Never done    Shingrix Vaccine Age 50> (1 of 2) Never done    AAA Screening 73-67 YO Male Smoking Patients Never done    Pneumococcal 65+ years (1 - PCV) Never done    COVID-19 Vaccine (4 - Booster for Moderna series) 02/28/2022    Flu Vaccine (1) 08/01/2022    Medicare Yearly Exam  11/03/2022   . Coordination of Care:  1. \"Have you been to the ER, urgent care clinic since your last visit? Hospitalized since your last visit? \" No    2. \"Have you seen or consulted any other health care providers outside of the [address] [place] [address] since your last visit? \"  No      3. For patients aged 39-70: Has the patient had a colonoscopy? Yes - Care Gap present. Most recent result on file     If the patient is female:    4. For patients aged 41-77: Has the patient had a mammogram within the past 2 years? No    5. For patients aged 21-65: Has the patient had a pap smear?  No

## 2022-11-02 ENCOUNTER — ANESTHESIA EVENT (OUTPATIENT)
Dept: SURGERY | Age: 67
End: 2022-11-02
Payer: MEDICARE

## 2022-11-02 RX ORDER — SODIUM CHLORIDE 0.9 % (FLUSH) 0.9 %
5-40 SYRINGE (ML) INJECTION EVERY 8 HOURS
Status: CANCELLED | OUTPATIENT
Start: 2022-11-02

## 2022-11-02 RX ORDER — INSULIN LISPRO 100 [IU]/ML
INJECTION, SOLUTION INTRAVENOUS; SUBCUTANEOUS ONCE
Status: CANCELLED | OUTPATIENT
Start: 2022-11-02 | End: 2022-11-02

## 2022-11-02 RX ORDER — SODIUM CHLORIDE, SODIUM LACTATE, POTASSIUM CHLORIDE, CALCIUM CHLORIDE 600; 310; 30; 20 MG/100ML; MG/100ML; MG/100ML; MG/100ML
25 INJECTION, SOLUTION INTRAVENOUS CONTINUOUS
Status: CANCELLED | OUTPATIENT
Start: 2022-11-02 | End: 2022-11-03

## 2022-11-09 ENCOUNTER — HOSPITAL ENCOUNTER (OUTPATIENT)
Age: 67
Discharge: HOME | End: 2022-11-09
Attending: OPHTHALMOLOGY | Admitting: OPHTHALMOLOGY
Payer: MEDICARE

## 2022-11-09 ENCOUNTER — ANESTHESIA (OUTPATIENT)
Dept: SURGERY | Age: 67
End: 2022-11-09
Payer: MEDICARE

## 2022-11-09 VITALS
TEMPERATURE: 97 F | OXYGEN SATURATION: 96 % | DIASTOLIC BLOOD PRESSURE: 90 MMHG | RESPIRATION RATE: 14 BRPM | BODY MASS INDEX: 26.58 KG/M2 | HEART RATE: 69 BPM | SYSTOLIC BLOOD PRESSURE: 112 MMHG | WEIGHT: 207 LBS

## 2022-11-09 PROCEDURE — 77030020272 HC MISC IMPL LENS: Performed by: OPHTHALMOLOGY

## 2022-11-09 PROCEDURE — 2709999900 HC NON-CHARGEABLE SUPPLY: Performed by: OPHTHALMOLOGY

## 2022-11-09 PROCEDURE — 74011000636 HC RX REV CODE- 636: Performed by: OPHTHALMOLOGY

## 2022-11-09 PROCEDURE — V2632 POST CHMBR INTRAOCULAR LENS: HCPCS | Performed by: OPHTHALMOLOGY

## 2022-11-09 PROCEDURE — 74011250636 HC RX REV CODE- 250/636: Performed by: OPHTHALMOLOGY

## 2022-11-09 PROCEDURE — 76010000154 HC OR TIME FIRST 0.5 HR: Performed by: OPHTHALMOLOGY

## 2022-11-09 PROCEDURE — 76210000020 HC REC RM PH II FIRST 0.5 HR: Performed by: OPHTHALMOLOGY

## 2022-11-09 PROCEDURE — 74011000250 HC RX REV CODE- 250: Performed by: OPHTHALMOLOGY

## 2022-11-09 RX ORDER — EPINEPHRINE 1 MG/ML
INJECTION, SOLUTION, CONCENTRATE INTRAVENOUS AS NEEDED
Status: DISCONTINUED | OUTPATIENT
Start: 2022-11-09 | End: 2022-11-09 | Stop reason: HOSPADM

## 2022-11-09 RX ORDER — MOXIFLOXACIN 5 MG/ML
SOLUTION/ DROPS OPHTHALMIC AS NEEDED
Status: DISCONTINUED | OUTPATIENT
Start: 2022-11-09 | End: 2022-11-09 | Stop reason: HOSPADM

## 2022-11-09 RX ORDER — TETRACAINE HYDROCHLORIDE 5 MG/ML
SOLUTION OPHTHALMIC AS NEEDED
Status: DISCONTINUED | OUTPATIENT
Start: 2022-11-09 | End: 2022-11-09 | Stop reason: HOSPADM

## 2022-11-09 RX ORDER — LIDOCAINE HYDROCHLORIDE 10 MG/ML
INJECTION, SOLUTION EPIDURAL; INFILTRATION; INTRACAUDAL; PERINEURAL AS NEEDED
Status: DISCONTINUED | OUTPATIENT
Start: 2022-11-09 | End: 2022-11-09 | Stop reason: HOSPADM

## 2022-11-09 RX ORDER — SODIUM CHLORIDE 0.9 % (FLUSH) 0.9 %
5-40 SYRINGE (ML) INJECTION AS NEEDED
Status: DISCONTINUED | OUTPATIENT
Start: 2022-11-09 | End: 2022-11-09 | Stop reason: HOSPADM

## 2022-11-09 RX ORDER — SODIUM CHLORIDE 0.9 % (FLUSH) 0.9 %
5-40 SYRINGE (ML) INJECTION AS NEEDED
Status: CANCELLED | OUTPATIENT
Start: 2022-11-09

## 2022-11-09 RX ADMIN — Medication 1 EACH: at 09:25

## 2022-11-09 NOTE — DISCHARGE INSTRUCTIONS
POST-OPERATIVE INSTRUCTIONS FOR CATARACT SURGERY     1. No heavy lifting (no more than 5 pounds). No Bending at waist and No strenuous activity for one (1) week. 2. DO NOT RUB YOUR EYE!!! Wear eye protection at all times when going outdoors (glasses, sunglasses,        ect) and wear shield while sleeping/napping for the first week after surgery. 3. DO NOT GET WATER IN YOUR EYES FOR THE NEXT 3 DAYS. You may gently clean your face and you        may shower, but don't put any pressure on the eye. Ladies, no eye makeup for one (1) week after surgery. Do not swim or get into a hot tub for three (3) weeks. 4. You may experience eye irritation, aching, itching and blurred vision for several days. Use Tylenol for         Discomfort but DO NOT RUB YOUR EYE . 5. Call your doctor with any increased pain, redness, nausea, vomiting, or worsening vision. Also call your doctor        with new flashes of light, many new floaters or a curtain/veil coming into your vision.                                                8 Valorie De La Cruzidi YOUR HANDS BEFORE PUTTING IN YOUR DROPS                                       ALLOW 5 MINUTES BETWEEN DIFFERENT DROPS                       IF YOU HAVE ANY QUESTION OR CONCERNS DURING OFFICE HOUR                      CALL [phone] OR [phone] AFTER HOURS OR ON WEEKENDS

## 2022-11-09 NOTE — OP NOTES
OPERATIVE REPORT     PATIENT INFORMATION:    Patient: Abelardo Kennedy. MRN: [id number]  SSN: [id number]    YOB: 1955  Age: 79 y.o. Sex: male        LOCATION OF SURGERY: [address]:  11/09/22      PRE-OPERATIVE DIAGNOSIS: Cataract Right eye. POST OPERATIVE DIAGNOSIS: Cataract Right eye. PROCEDURE PERFORMED: Phacoemulsification with intraocular lens Right eye. SURGEON: Renan Roe M.D. ANESTHESIA: Local anesthesia. COMPLICATIONS: None. BLOOD LOSS: None      INDICATIONS FOR PROCEDURE:   This is a 79y.o. year old male with progressively decreasing vision in the right eye. Risks, benefits and alternatives of surgery were explained at length with the patient and informed consent was obtained. PROCEDURE:   The patient was met in the pre-operative holding area where confirmation was made that the right eye was to be operated on and surgical eye was marked. The patient was taken to the operating room. Following the instillation of topical tetracaine drops to the operative eye the patient was then prepped and draped in the usual sterile fashion for ophthalmic surgery. The lid speculum was placed and the lids were retracted. Sapphire Brash was placed in the inferior puncta without complication for post-operative pain control. A paracentesis was made through the clear cornea, shugarcaine was injected in the eye for improved dilation. The anterior chamber was deepened with viscoelastic material. A 2.4 mm keratome was used to make a self-sealing clear corneal incision. Capsulorrhexis was initiated with a cystotome and completed with Utrata forceps without difficulty. Hydrodissection was completed and good fluid wave was visualized. Phacoemulsification was initiated and completed in a divide and conquer fashion without difficulty.  Irrigation and aspiration was used to remove the residual cortical material from the capsular bag and then the capsular bag and the anterior chamber were deepened with viscoelastic material. The bag was inspected and found to be free of any tears or defects. The intraocular lens implant listed below was placed into the capsular bag with good centration. Irrigation and aspiration was used to remove the residual viscoelastic material from the capsular bag and the anterior chamber and the incision sites were hydrated the BSS and cannula. The incisions were rehydrated and inspected with a weck-nathan sponge and were found to be water tight. The eye was inspected and the anterior chamber was deep, the pupil round, and the lens was in good position. The lid speculum was then removed under visualization. Several drops of antibiotic eye drops were instilled into patient's operated eye. The operated eye was then covered with an eyeshield. The patient tolerated the procedure well and was transferred to recovery in good condition. Implant Name Type Inv.  Item Serial No.  Lot No. LRB No. Used Action   TECNIS EYHANCE IOL DIB00 23.5   [id number]   Right 1 Implanted        Edda Holden MD  11/09/22 1:30 PM

## 2022-11-09 NOTE — H&P
SEE original H&P Scanned into chart. Date of Surgery Update:  Flor Babin was seen and examined. History and physical has been reviewed. The patient has been examined. There have been no significant clinical changes since the completion of the originally dated History and Physical.    Pt complains of persistent poor vision in planned operative eye affecting ability to perform basic ADLs. Past Medical History:   Diagnosis Date    Hypertension     Thromboembolus ([address] 1996    left leg        Family Medical History: Non-contributory    Prior to Admission Medications   Prescriptions Last Dose Informant Patient Reported? Taking? amLODIPine (NORVASC) 5 mg tablet 11/9/2022  No Yes   Sig: TAKE 1 TABLET BY MOUTH DAILY. cyanocobalamin 1,000 mcg tablet 11/9/2022  Yes Yes   Sig: Take 1,000 mcg by mouth daily. loratadine (CLARITIN) 10 mg tablet 11/9/2022  Yes Yes   Sig: Take 10 mg by mouth.   losartan (COZAAR) 100 mg tablet 11/9/2022  No Yes   Sig: Take 1 Tablet by mouth daily. meloxicam (MOBIC) 15 mg tablet 11/9/2022  No Yes   Sig: Take 1 Tablet by mouth daily. metoprolol succinate (TOPROL-XL) 100 mg tablet 11/9/2022  No Yes   Sig: Take 2 Tablets by mouth daily. pantoprazole (PROTONIX) 40 mg tablet 11/8/2022  No Yes   Sig: Take 1 Tablet by mouth daily. tamsulosin (FLOMAX) 0.4 mg capsule 11/9/2022  No Yes   Sig: Take 2 Capsules by mouth daily. warfarin (COUMADIN) 5 mg tablet 11/2/2022  No Yes   Sig: Take 2 pills every third day. warfarin (COUMADIN) 7.5 mg tablet 11/8/2022  No Yes   Sig: Take 1 Tablet by mouth daily. Take one daily      Facility-Administered Medications: None        Allergies   Allergen Reactions    Sulfa (Sulfonamide Antibiotics) Other (comments)        Review of Systems   Constitutional:  Negative for chills and fever. HENT:  Negative for congestion and sore throat. Eyes:  Positive for blurred vision. Respiratory:  Negative for cough and shortness of breath. Cardiovascular:  Negative for chest pain and palpitations. Gastrointestinal:  Negative for nausea and vomiting. Genitourinary:  Negative for dysuria. Musculoskeletal:  Negative for myalgias. Skin:  Negative for rash. Neurological:  Negative for dizziness and headaches. Visit Vitals  BP (!) 155/83   Pulse (!) 58   Temp 97.3 °F (36.3 °C)   Resp 16   Wt 93.9 kg (207 lb)   SpO2 96%   BMI 26.58 kg/m²       Alert & Oriented x 3  CV: Regular Rate, Regular rhythm, no murmurs/rubs/gallops  Pulm: Clear to auscultation bilaterally, no wheezes/rales/rhonchi  Neuro: CNII-XII grossly intact      Proceed with Surgery as planned.      Signed By: Lopez Cotton MD   11/09/22, 9:35 AM

## 2022-11-11 ENCOUNTER — TRANSCRIBE ORDER (OUTPATIENT)
Dept: REGISTRATION | Age: 67
End: 2022-11-11

## 2022-11-11 ENCOUNTER — HOSPITAL ENCOUNTER (OUTPATIENT)
Dept: LAB | Age: 67
Discharge: HOME | End: 2022-11-11
Payer: MEDICARE

## 2022-11-11 DIAGNOSIS — E83.118 PRECIRRHOTIC HEMOCHROMATOSIS: ICD-10-CM

## 2022-11-11 DIAGNOSIS — I82.409 DEEP VEIN THROMBOSIS (DVT) OF LOWER EXTREMITY, UNSPECIFIED CHRONICITY, UNSPECIFIED LATERALITY, UNSPECIFIED VEIN (HCC): ICD-10-CM

## 2022-11-11 DIAGNOSIS — E83.118 PRECIRRHOTIC HEMOCHROMATOSIS: Primary | ICD-10-CM

## 2022-11-11 LAB
BASOPHILS # BLD: 0 K/UL (ref 0–0.1)
BASOPHILS NFR BLD: 1 % (ref 0–2)
DIFFERENTIAL METHOD BLD: ABNORMAL
EOSINOPHIL # BLD: 0.3 K/UL (ref 0–0.4)
EOSINOPHIL NFR BLD: 5 % (ref 0–5)
ERYTHROCYTE [DISTWIDTH] IN BLOOD BY AUTOMATED COUNT: 12.8 % (ref 11.6–14.5)
FERRITIN SERPL-MCNC: 80 NG/ML (ref 8–388)
HCT VFR BLD AUTO: 45.1 % (ref 36–48)
HGB BLD-MCNC: 15.1 G/DL (ref 13–16)
IMM GRANULOCYTES # BLD AUTO: 0 K/UL (ref 0–0.04)
IMM GRANULOCYTES NFR BLD AUTO: 0 % (ref 0–0.5)
INR PPP: 2.7 (ref 0.8–1.2)
LYMPHOCYTES # BLD: 2 K/UL (ref 0.9–3.6)
LYMPHOCYTES NFR BLD: 32 % (ref 21–52)
MCH RBC QN AUTO: 31.5 PG (ref 24–34)
MCHC RBC AUTO-ENTMCNC: 33.5 G/DL (ref 31–37)
MCV RBC AUTO: 94.2 FL (ref 78–100)
MONOCYTES # BLD: 0.7 K/UL (ref 0.05–1.2)
MONOCYTES NFR BLD: 11 % (ref 3–10)
NEUTS SEG # BLD: 3.3 K/UL (ref 1.8–8)
NEUTS SEG NFR BLD: 51 % (ref 40–73)
NRBC # BLD: 0 K/UL (ref 0–0.01)
NRBC BLD-RTO: 0 PER 100 WBC
PLATELET # BLD AUTO: 195 K/UL (ref 135–420)
PMV BLD AUTO: 11.8 FL (ref 9.2–11.8)
PROTHROMBIN TIME: 29.2 SEC (ref 11.5–15.2)
RBC # BLD AUTO: 4.79 M/UL (ref 4.35–5.65)
WBC # BLD AUTO: 6.4 K/UL (ref 4.6–13.2)

## 2022-11-11 PROCEDURE — 36415 COLL VENOUS BLD VENIPUNCTURE: CPT

## 2022-11-11 PROCEDURE — 85025 COMPLETE CBC W/AUTO DIFF WBC: CPT

## 2022-11-11 PROCEDURE — 83550 IRON BINDING TEST: CPT

## 2022-11-11 PROCEDURE — 82728 ASSAY OF FERRITIN: CPT

## 2022-11-11 PROCEDURE — 85610 PROTHROMBIN TIME: CPT

## 2022-11-12 LAB — TIBC SERPL-MCNC: 328 UG/DL (ref 250–450)

## 2022-11-28 RX ORDER — METOPROLOL SUCCINATE 100 MG/1
200 TABLET, EXTENDED RELEASE ORAL DAILY
Qty: 180 TABLET | Refills: 3 | Status: SHIPPED | OUTPATIENT
Start: 2022-11-28

## 2022-11-30 RX ORDER — AMLODIPINE BESYLATE 5 MG/1
5 TABLET ORAL DAILY
Qty: 90 TABLET | Refills: 0 | Status: SHIPPED | OUTPATIENT
Start: 2022-11-30

## 2022-11-30 RX ORDER — MELOXICAM 15 MG/1
15 TABLET ORAL DAILY
Qty: 90 TABLET | Refills: 3 | Status: SHIPPED | OUTPATIENT
Start: 2022-11-30

## 2022-11-30 RX ORDER — LOSARTAN POTASSIUM 100 MG/1
100 TABLET ORAL DAILY
Qty: 90 TABLET | Refills: 3 | Status: SHIPPED | OUTPATIENT
Start: 2022-11-30

## 2023-01-03 ENCOUNTER — HOSPITAL ENCOUNTER (OUTPATIENT)
Dept: LAB | Age: 68
Discharge: HOME | End: 2023-01-03
Payer: MEDICARE

## 2023-01-03 DIAGNOSIS — I82.409 DEEP VEIN THROMBOSIS (DVT) OF LOWER EXTREMITY, UNSPECIFIED CHRONICITY, UNSPECIFIED LATERALITY, UNSPECIFIED VEIN (HCC): ICD-10-CM

## 2023-01-03 LAB
INR PPP: 2.3 (ref 0.8–1.2)
PROTHROMBIN TIME: 25.3 SEC (ref 11.5–15.2)

## 2023-01-03 PROCEDURE — 36415 COLL VENOUS BLD VENIPUNCTURE: CPT

## 2023-01-03 PROCEDURE — 85610 PROTHROMBIN TIME: CPT

## 2023-01-12 RX ORDER — SILDENAFIL 100 MG/1
TABLET, FILM COATED ORAL
Qty: 4 TABLET | Refills: 5 | Status: SHIPPED | OUTPATIENT
Start: 2023-01-12

## 2023-02-20 RX ORDER — PANTOPRAZOLE SODIUM 40 MG/1
TABLET, DELAYED RELEASE ORAL
Qty: 90 TABLET | Refills: 1 | Status: SHIPPED | OUTPATIENT
Start: 2023-02-20

## 2023-02-28 RX ORDER — AMLODIPINE BESYLATE 5 MG/1
TABLET ORAL
Qty: 90 TABLET | Refills: 1 | Status: SHIPPED | OUTPATIENT
Start: 2023-02-28

## 2023-02-28 RX ORDER — TAMSULOSIN HYDROCHLORIDE 0.4 MG/1
CAPSULE ORAL
Qty: 180 CAPSULE | Refills: 1 | Status: SHIPPED | OUTPATIENT
Start: 2023-02-28

## 2023-03-03 ENCOUNTER — HOSPITAL ENCOUNTER (OUTPATIENT)
Age: 68
End: 2023-03-03
Payer: MEDICARE

## 2023-03-03 DIAGNOSIS — O22.30 DEEP PHLEBOTHROMBOSIS, ANTEPARTUM, WITH DELIVERY (HCC): ICD-10-CM

## 2023-03-03 DIAGNOSIS — Z79.01 CHRONIC ANTICOAGULATION: ICD-10-CM

## 2023-03-03 DIAGNOSIS — Z79.01 CHRONIC ANTICOAGULATION: Primary | ICD-10-CM

## 2023-03-03 DIAGNOSIS — I82.409 DEEP PHLEBOTHROMBOSIS, ANTEPARTUM, WITH DELIVERY (HCC): ICD-10-CM

## 2023-03-03 LAB
INR PPP: 2.4 (ref 0.8–1.2)
PROTHROMBIN TIME: 26.3 SEC (ref 11.5–15.2)

## 2023-03-03 PROCEDURE — 36415 COLL VENOUS BLD VENIPUNCTURE: CPT

## 2023-03-03 PROCEDURE — 85610 PROTHROMBIN TIME: CPT

## 2023-03-03 NOTE — PROGRESS NOTES
Patient is on chronic anticoagulation with warfarin. Will put in standing order for PT/INR to be done once a month.

## 2023-03-06 ENCOUNTER — TELEPHONE (OUTPATIENT)
Facility: CLINIC | Age: 68
End: 2023-03-06

## 2023-03-06 NOTE — TELEPHONE ENCOUNTER
Called patient to speak about message from Christian Mandujano MD. Confirmed name and date of birth. Spoke with patient about message. Patient expressed understanding.

## 2023-03-06 NOTE — TELEPHONE ENCOUNTER
----- Message from Jorge Watson MD sent at 3/3/2023  5:33 PM EST -----  Within normal range.  Continue current doses

## 2023-04-14 ENCOUNTER — HOSPITAL ENCOUNTER (OUTPATIENT)
Age: 68
Discharge: HOME | End: 2023-04-17
Payer: MEDICARE

## 2023-04-14 PROBLEM — I26.99 OTHER PULMONARY EMBOLISM WITHOUT ACUTE COR PULMONALE, UNSPECIFIED CHRONICITY (HCC): Status: ACTIVE | Noted: 2023-04-14

## 2023-04-14 LAB
ALBUMIN SERPL-MCNC: 4 G/DL (ref 3.4–5)
ALBUMIN/GLOB SERPL: 1.3 (ref 0.8–1.7)
ALP SERPL-CCNC: 83 U/L (ref 45–117)
ALT SERPL-CCNC: 30 U/L (ref 16–61)
ANION GAP SERPL CALC-SCNC: 3 MMOL/L (ref 3–18)
AST SERPL W P-5'-P-CCNC: 24 U/L (ref 10–38)
BASOPHILS # BLD: 0 K/UL (ref 0–0.1)
BASOPHILS NFR BLD: 1 % (ref 0–2)
BILIRUB SERPL-MCNC: 0.6 MG/DL (ref 0.2–1)
BUN SERPL-MCNC: 19 MG/DL (ref 7–18)
BUN/CREAT SERPL: 23 (ref 12–20)
CA-I BLD-MCNC: 9.1 MG/DL (ref 8.5–10.1)
CHLORIDE SERPL-SCNC: 108 MMOL/L (ref 100–111)
CHOLEST SERPL-MCNC: 169 MG/DL
CO2 SERPL-SCNC: 27 MMOL/L (ref 21–32)
CREAT SERPL-MCNC: 0.82 MG/DL (ref 0.6–1.3)
DIFFERENTIAL METHOD BLD: ABNORMAL
EOSINOPHIL # BLD: 0.2 K/UL (ref 0–0.4)
EOSINOPHIL NFR BLD: 3 % (ref 0–5)
ERYTHROCYTE [DISTWIDTH] IN BLOOD BY AUTOMATED COUNT: 12.6 % (ref 11.6–14.5)
GLOBULIN SER CALC-MCNC: 3.2 G/DL (ref 2–4)
GLUCOSE SERPL-MCNC: 96 MG/DL (ref 74–99)
HCT VFR BLD AUTO: 45.6 % (ref 36–48)
HDLC SERPL-MCNC: 49 MG/DL (ref 40–60)
HDLC SERPL: 3.4 (ref 0–5)
HGB BLD-MCNC: 15.6 G/DL (ref 13–16)
IMM GRANULOCYTES # BLD AUTO: 0 K/UL (ref 0–0.04)
IMM GRANULOCYTES NFR BLD AUTO: 0 % (ref 0–0.5)
LDLC SERPL CALC-MCNC: 99 MG/DL (ref 0–100)
LIPID PANEL: NORMAL
LYMPHOCYTES # BLD: 2 K/UL (ref 0.9–3.6)
LYMPHOCYTES NFR BLD: 34 % (ref 21–52)
MCH RBC QN AUTO: 32.1 PG (ref 24–34)
MCHC RBC AUTO-ENTMCNC: 34.2 G/DL (ref 31–37)
MCV RBC AUTO: 93.8 FL (ref 78–100)
MONOCYTES # BLD: 0.7 K/UL (ref 0.05–1.2)
MONOCYTES NFR BLD: 12 % (ref 3–10)
NEUTS SEG # BLD: 2.9 K/UL (ref 1.8–8)
NEUTS SEG NFR BLD: 50 % (ref 40–73)
NRBC # BLD: 0 K/UL (ref 0–0.01)
NRBC BLD-RTO: 0 PER 100 WBC
PLATELET # BLD AUTO: 191 K/UL (ref 135–420)
PMV BLD AUTO: 11.4 FL (ref 9.2–11.8)
POTASSIUM SERPL-SCNC: 4.2 MMOL/L (ref 3.5–5.5)
PROT SERPL-MCNC: 7.2 G/DL (ref 6.4–8.2)
RBC # BLD AUTO: 4.86 M/UL (ref 4.35–5.65)
SODIUM SERPL-SCNC: 138 MMOL/L (ref 136–145)
TRIGL SERPL-MCNC: 105 MG/DL
VLDLC SERPL CALC-MCNC: 21 MG/DL
WBC # BLD AUTO: 5.9 K/UL (ref 4.6–13.2)

## 2023-04-14 PROCEDURE — 80061 LIPID PANEL: CPT

## 2023-04-14 PROCEDURE — 80053 COMPREHEN METABOLIC PANEL: CPT

## 2023-04-14 PROCEDURE — 85025 COMPLETE CBC W/AUTO DIFF WBC: CPT

## 2023-04-14 PROCEDURE — 86803 HEPATITIS C AB TEST: CPT

## 2023-04-14 PROCEDURE — 36415 COLL VENOUS BLD VENIPUNCTURE: CPT

## 2023-04-17 LAB
HCV AB SER IA-ACNC: 0.02 INDEX
HCV AB SERPL QL IA: NEGATIVE
Lab: NORMAL

## 2023-04-24 ENCOUNTER — HOSPITAL ENCOUNTER (OUTPATIENT)
Age: 68
Discharge: HOME | End: 2023-04-27
Payer: MEDICARE

## 2023-04-24 DIAGNOSIS — Z13.6 ENCOUNTER FOR ABDOMINAL AORTIC ANEURYSM (AAA) SCREENING: ICD-10-CM

## 2023-04-24 PROCEDURE — 76706 US ABDL AORTA SCREEN AAA: CPT

## 2023-05-17 ENCOUNTER — HOSPITAL ENCOUNTER (OUTPATIENT)
Age: 68
Discharge: HOME | End: 2023-05-20
Payer: MEDICARE

## 2023-05-17 ENCOUNTER — HOSPITAL ENCOUNTER (OUTPATIENT)
Age: 68
Discharge: HOME | End: 2023-05-20

## 2023-05-17 DIAGNOSIS — I10 ESSENTIAL (PRIMARY) HYPERTENSION: ICD-10-CM

## 2023-05-17 DIAGNOSIS — E83.118 PRECIRRHOTIC HEMOCHROMATOSIS: ICD-10-CM

## 2023-05-17 DIAGNOSIS — Z11.59 NEED FOR HEPATITIS C SCREENING TEST: ICD-10-CM

## 2023-05-17 DIAGNOSIS — I26.99 OTHER PULMONARY EMBOLISM WITHOUT ACUTE COR PULMONALE, UNSPECIFIED CHRONICITY (HCC): ICD-10-CM

## 2023-05-17 DIAGNOSIS — Z79.01 CHRONIC ANTICOAGULATION: ICD-10-CM

## 2023-05-17 LAB
BASOPHILS # BLD: 0.1 K/UL (ref 0–0.1)
BASOPHILS NFR BLD: 1 % (ref 0–2)
DIFFERENTIAL METHOD BLD: ABNORMAL
EOSINOPHIL # BLD: 0.2 K/UL (ref 0–0.4)
EOSINOPHIL NFR BLD: 5 % (ref 0–5)
ERYTHROCYTE [DISTWIDTH] IN BLOOD BY AUTOMATED COUNT: 12.3 % (ref 11.6–14.5)
FERRITIN SERPL-MCNC: 147 NG/ML (ref 8–388)
HCT VFR BLD AUTO: 43.9 % (ref 36–48)
HGB BLD-MCNC: 15 G/DL (ref 13–16)
IMM GRANULOCYTES # BLD AUTO: 0 K/UL (ref 0–0.04)
IMM GRANULOCYTES NFR BLD AUTO: 0 % (ref 0–0.5)
IRON SATN MFR SERPL: 69 % (ref 20–50)
IRON SERPL-MCNC: 190 UG/DL (ref 50–175)
LYMPHOCYTES # BLD: 1.5 K/UL (ref 0.9–3.6)
LYMPHOCYTES NFR BLD: 29 % (ref 21–52)
MCH RBC QN AUTO: 32.3 PG (ref 24–34)
MCHC RBC AUTO-ENTMCNC: 34.2 G/DL (ref 31–37)
MCV RBC AUTO: 94.6 FL (ref 78–100)
MONOCYTES # BLD: 0.7 K/UL (ref 0.05–1.2)
MONOCYTES NFR BLD: 14 % (ref 3–10)
NEUTS SEG # BLD: 2.7 K/UL (ref 1.8–8)
NEUTS SEG NFR BLD: 51 % (ref 40–73)
NRBC # BLD: 0 K/UL (ref 0–0.01)
NRBC BLD-RTO: 0 PER 100 WBC
PLATELET # BLD AUTO: 189 K/UL (ref 135–420)
PMV BLD AUTO: 11.1 FL (ref 9.2–11.8)
RBC # BLD AUTO: 4.64 M/UL (ref 4.35–5.65)
TIBC SERPL-MCNC: 275 UG/DL (ref 250–450)
WBC # BLD AUTO: 5.2 K/UL (ref 4.6–13.2)

## 2023-05-17 PROCEDURE — 36415 COLL VENOUS BLD VENIPUNCTURE: CPT

## 2023-05-17 PROCEDURE — 82728 ASSAY OF FERRITIN: CPT

## 2023-05-17 PROCEDURE — 83540 ASSAY OF IRON: CPT

## 2023-05-17 PROCEDURE — 85025 COMPLETE CBC W/AUTO DIFF WBC: CPT

## 2023-05-26 ENCOUNTER — OFFICE VISIT (OUTPATIENT)
Facility: CLINIC | Age: 68
End: 2023-05-26

## 2023-05-26 VITALS
BODY MASS INDEX: 27.99 KG/M2 | HEIGHT: 74 IN | SYSTOLIC BLOOD PRESSURE: 139 MMHG | HEART RATE: 60 BPM | DIASTOLIC BLOOD PRESSURE: 80 MMHG | RESPIRATION RATE: 19 BRPM | OXYGEN SATURATION: 98 % | WEIGHT: 218.1 LBS | TEMPERATURE: 99 F

## 2023-05-26 DIAGNOSIS — Z00.00 MEDICARE ANNUAL WELLNESS VISIT, SUBSEQUENT: Primary | ICD-10-CM

## 2023-05-26 DIAGNOSIS — I26.99 OTHER PULMONARY EMBOLISM WITHOUT ACUTE COR PULMONALE, UNSPECIFIED CHRONICITY (HCC): ICD-10-CM

## 2023-05-26 DIAGNOSIS — I10 ESSENTIAL HYPERTENSION: ICD-10-CM

## 2023-05-26 DIAGNOSIS — Z86.718 HISTORY OF DVT (DEEP VEIN THROMBOSIS): ICD-10-CM

## 2023-05-26 DIAGNOSIS — Z71.89 ADVANCED CARE PLANNING/COUNSELING DISCUSSION: ICD-10-CM

## 2023-05-26 DIAGNOSIS — E83.110 PRIMARY HEMOCHROMATOSIS (HCC): ICD-10-CM

## 2023-05-26 LAB
POC INR: 2.8
PROTHROMBIN TIME, POC: 33.5

## 2023-05-26 SDOH — HEALTH STABILITY: PHYSICAL HEALTH: ON AVERAGE, HOW MANY MINUTES DO YOU ENGAGE IN EXERCISE AT THIS LEVEL?: 40 MIN

## 2023-05-26 SDOH — HEALTH STABILITY: PHYSICAL HEALTH: ON AVERAGE, HOW MANY DAYS PER WEEK DO YOU ENGAGE IN MODERATE TO STRENUOUS EXERCISE (LIKE A BRISK WALK)?: 3 DAYS

## 2023-05-26 ASSESSMENT — LIFESTYLE VARIABLES
HOW OFTEN DO YOU HAVE SIX OR MORE DRINKS ON ONE OCCASION: 1
HOW OFTEN DO YOU HAVE A DRINK CONTAINING ALCOHOL: NEVER
HOW MANY STANDARD DRINKS CONTAINING ALCOHOL DO YOU HAVE ON A TYPICAL DAY: 0
HOW MANY STANDARD DRINKS CONTAINING ALCOHOL DO YOU HAVE ON A TYPICAL DAY: PATIENT DOES NOT DRINK
HOW OFTEN DO YOU HAVE A DRINK CONTAINING ALCOHOL: 1

## 2023-05-26 ASSESSMENT — PATIENT HEALTH QUESTIONNAIRE - PHQ9
SUM OF ALL RESPONSES TO PHQ9 QUESTIONS 1 & 2: 0
2. FEELING DOWN, DEPRESSED OR HOPELESS: 0
SUM OF ALL RESPONSES TO PHQ QUESTIONS 1-9: 0
1. LITTLE INTEREST OR PLEASURE IN DOING THINGS: 0
SUM OF ALL RESPONSES TO PHQ QUESTIONS 1-9: 0

## 2023-05-26 NOTE — PROGRESS NOTES
Medicare Annual Wellness Visit    Janet Hamlin. is here for Medicare AWV (Medicare wellness)    Assessment & Plan   Medicare annual wellness visit, subsequent  Advanced care planning/counseling discussion  Comments:  Discussed the importance of advanced care planning. Will provide paperwork he can fill out and bring to next appt  Primary hemochromatosis ([address]  Comments:  stable. followed by hematology  Essential hypertension  Comments:  controlled. continue current managment  Other pulmonary embolism without acute cor pulmonale, unspecified chronicity (HCC)  Comments:  stable. INR check today 2.8. Continue current management  History of DVT (deep vein thrombosis)  Comments:  stable. on chronic warfarin. INR today 2.8. Continue current management  Orders:  -     AMB POC PT/INR    Recommendations for Preventive Services Due: see orders and patient instructions/AVS.  Recommended screening schedule for the next 5-10 years is provided to the patient in written form: see Patient Instructions/AVS.     No follow-ups on file. Subjective       Patient's complete Health Risk Assessment and screening values have been reviewed and are found in Flowsheets. The following problems were reviewed today and where indicated follow up appointments were made and/or referrals ordered. Positive Risk Factor Screenings with Interventions:                  Dentist Screen:  Have you seen the dentist within the past year?: (!) No    Intervention:  Advised to schedule with their dentist                        Objective   Vitals:    05/26/23 0740   BP: 139/80   Site: Left Upper Arm   Position: Sitting   Cuff Size: Large Adult   Pulse: 60   Resp: 19   Temp: 99 °F (37.2 °C)   TempSrc: Temporal   SpO2: 98%   Weight: 218 lb 1.6 oz (98.9 kg)   Height: 6' 2\" (1.88 m)      Body mass index is 28 kg/m².              Allergies   Allergen Reactions    Sulfa Antibiotics Other (See Comments)     Prior to Visit Medications    Medication Sig

## 2023-05-26 NOTE — PATIENT INSTRUCTIONS
Personalized Preventive Plan for Michelle Sumner. - 5/26/2023  Medicare offers a range of preventive health benefits. Some of the tests and screenings are paid in full while other may be subject to a deductible, co-insurance, and/or copay. Some of these benefits include a comprehensive review of your medical history including lifestyle, illnesses that may run in your family, and various assessments and screenings as appropriate. After reviewing your medical record and screening and assessments performed today your provider may have ordered immunizations, labs, imaging, and/or referrals for you. A list of these orders (if applicable) as well as your Preventive Care list are included within your After Visit Summary for your review. Other Preventive Recommendations:    A preventive eye exam performed by an eye specialist is recommended every 1-2 years to screen for glaucoma; cataracts, macular degeneration, and other eye disorders. A preventive dental visit is recommended every 6 months. Try to get at least 150 minutes of exercise per week or 10,000 steps per day on a pedometer . Order or download the FREE \"Exercise & Physical Activity: Your Everyday Guide\" from [employer]. Call 1-128.237.3723 or search [employer] online. You need 6302-3215 mg of calcium and 8242-8092 IU of vitamin D per day. It is possible to meet your calcium requirement with diet alone, but a vitamin D supplement is usually necessary to meet this goal.  When exposed to the sun, use a sunscreen that protects against both UVA and UVB radiation with an SPF of 30 or greater. Reapply every 2 to 3 hours or after sweating, drying off with a towel, or swimming. Always wear a seat belt when traveling in a car. Always wear a helmet when riding a bicycle or motorcycle.

## 2023-05-26 NOTE — PROGRESS NOTES
Irena Marley. presents today for   Chief Complaint   Patient presents with    Medicare AWV     Medicare wellness       Is someone accompanying this pt? no    Is the patient using any DME equipment during OV? no    Health Maintenance reviewed and discussed and ordered per Provider. Health Maintenance Due   Topic Date Due    DTaP/Tdap/Td vaccine (1 - Tdap) Never done    Shingles vaccine (1 of 2) Never done    Pneumococcal 65+ years Vaccine (1 - PCV) Never done    COVID-19 Vaccine (4 - Booster for Moderna series) 12/25/2021   . Coordination of Care:  1. \"Have you been to the ER, urgent care clinic since your last visit? Hospitalized since your last visit? \" No    2. \"Have you seen or consulted any other health care providers outside of the [address] [place] [address] since your last visit? \" No    3. For patients aged 39-70: Has the patient had a colonoscopy? Yes- No care gap present    If the patient is female:    4. For patients aged 41-77: Has the patient had a mammogram within the past 2 years? N/A based on age/sex    5. For patients aged 21-65: Has the patient had a pap smear?  N/A based on age/sex

## 2023-05-26 NOTE — PROGRESS NOTES
Medicare Annual Wellness Visit    Kartik Lott. is here for Medicare AWV (Medicare wellness)    Assessment & Plan   Medicare annual wellness visit, subsequent      Recommendations for Preventive Services Due: see orders and patient instructions/AVS.  Recommended screening schedule for the next 5-10 years is provided to the patient in written form: see Patient Instructions/AVS.     No follow-ups on file. Subjective       Patient's complete Health Risk Assessment and screening values have been reviewed and are found in Flowsheets. The following problems were reviewed today and where indicated follow up appointments were made and/or referrals ordered. Positive Risk Factor Screenings with Interventions:                  Dentist Screen:  Have you seen the dentist within the past year?: (!) No                            Objective   There were no vitals filed for this visit. There is no height or weight on file to calculate BMI. Allergies   Allergen Reactions    Sulfa Antibiotics Other (See Comments)     Prior to Visit Medications    Medication Sig Taking? Authorizing Provider   cyanocobalamin 1000 MCG tablet Take 1 tablet by mouth daily  Ar Automatic Reconciliation   loratadine (CLARITIN) 10 MG tablet Take 1 tablet by mouth  Ar Automatic Reconciliation   losartan (COZAAR) 100 MG tablet Take 1 tablet by mouth daily  Ar Automatic Reconciliation   meloxicam (MOBIC) 15 MG tablet Take 1 tablet by mouth daily  Ar Automatic Reconciliation   metoprolol succinate (TOPROL XL) 100 MG extended release tablet Take 2 tablets by mouth daily  Ar Automatic Reconciliation   sildenafil (VIAGRA) 100 MG tablet TAKE 1 TABLET BY MOUTH ONCE DAILY AS NEEDED FOR 4 DAYS. Ar Automatic Reconciliation   warfarin (COUMADIN) 5 MG tablet Take 2 pills every third day.   Ar Automatic Reconciliation   warfarin (COUMADIN) 7.5 MG tablet Take 1 tablet by mouth daily  Ar Automatic Reconciliation   amLODIPine (NORVASC) 5 MG tablet

## 2023-05-30 RX ORDER — WARFARIN SODIUM 5 MG/1
TABLET ORAL
Qty: 90 TABLET | Refills: 0 | Status: SHIPPED | OUTPATIENT
Start: 2023-05-30

## 2023-07-06 ENCOUNTER — TELEPHONE (OUTPATIENT)
Facility: CLINIC | Age: 68
End: 2023-07-06

## 2023-07-06 ENCOUNTER — HOSPITAL ENCOUNTER (OUTPATIENT)
Age: 68
Discharge: HOME | End: 2023-07-06
Payer: MEDICARE

## 2023-07-06 DIAGNOSIS — Z79.01 CHRONIC ANTICOAGULATION: ICD-10-CM

## 2023-07-06 LAB
INR PPP: 2.4 (ref 0.8–1.2)
PROTHROMBIN TIME: 26.7 SEC (ref 11.5–15.2)

## 2023-07-06 PROCEDURE — 36415 COLL VENOUS BLD VENIPUNCTURE: CPT

## 2023-07-06 PROCEDURE — 85610 PROTHROMBIN TIME: CPT

## 2023-07-06 NOTE — TELEPHONE ENCOUNTER
----- Message from Jose Juan Lucas MD sent at 7/6/2023 11:10 AM EDT -----  That is in the normal range of 2-3    Called patient to speak about message from Esthela Joya MD. Confirmed name and date of birth. Spoke with patient about message. Patient expressed understanding.

## 2023-08-08 ENCOUNTER — HOSPITAL ENCOUNTER (OUTPATIENT)
Age: 68
Discharge: HOME | End: 2023-08-11
Payer: MEDICARE

## 2023-08-08 DIAGNOSIS — Z79.01 CHRONIC ANTICOAGULATION: ICD-10-CM

## 2023-08-08 LAB
INR PPP: 2.4 (ref 0.9–1.1)
PROTHROMBIN TIME: 26.3 SEC (ref 11.9–14.7)

## 2023-08-08 PROCEDURE — 85610 PROTHROMBIN TIME: CPT

## 2023-08-08 PROCEDURE — 36415 COLL VENOUS BLD VENIPUNCTURE: CPT

## 2023-08-25 RX ORDER — TAMSULOSIN HYDROCHLORIDE 0.4 MG/1
CAPSULE ORAL
Qty: 180 CAPSULE | Refills: 1 | Status: SHIPPED | OUTPATIENT
Start: 2023-08-25

## 2023-08-25 RX ORDER — PANTOPRAZOLE SODIUM 40 MG/1
TABLET, DELAYED RELEASE ORAL
Qty: 90 TABLET | Refills: 1 | Status: SHIPPED | OUTPATIENT
Start: 2023-08-25

## 2023-08-25 RX ORDER — AMLODIPINE BESYLATE 5 MG/1
TABLET ORAL
Qty: 90 TABLET | Refills: 1 | Status: SHIPPED | OUTPATIENT
Start: 2023-08-25

## 2023-08-28 RX ORDER — WARFARIN SODIUM 7.5 MG/1
7.5 TABLET ORAL DAILY
Qty: 90 TABLET | Refills: 0 | Status: SHIPPED | OUTPATIENT
Start: 2023-08-28

## 2023-08-28 RX ORDER — WARFARIN SODIUM 5 MG/1
TABLET ORAL
Qty: 90 TABLET | Refills: 0 | Status: SHIPPED | OUTPATIENT
Start: 2023-08-28

## 2023-09-01 ENCOUNTER — HOSPITAL ENCOUNTER (OUTPATIENT)
Age: 68
End: 2023-09-01
Payer: MEDICARE

## 2023-09-01 DIAGNOSIS — Z79.01 CHRONIC ANTICOAGULATION: ICD-10-CM

## 2023-09-01 LAB
INR PPP: 2.9 (ref 0.9–1.1)
PROTHROMBIN TIME: 30.1 SEC (ref 11.9–14.7)

## 2023-09-01 PROCEDURE — 36415 COLL VENOUS BLD VENIPUNCTURE: CPT

## 2023-09-01 PROCEDURE — 85610 PROTHROMBIN TIME: CPT

## 2023-10-06 ENCOUNTER — HOSPITAL ENCOUNTER (OUTPATIENT)
Age: 68
End: 2023-10-06
Payer: MEDICARE

## 2023-10-06 DIAGNOSIS — Z79.01 CHRONIC ANTICOAGULATION: ICD-10-CM

## 2023-10-06 LAB
INR PPP: 2.8 (ref 0.9–1.1)
PROTHROMBIN TIME: 30 SEC (ref 11.9–14.7)

## 2023-10-06 PROCEDURE — 36415 COLL VENOUS BLD VENIPUNCTURE: CPT

## 2023-10-06 PROCEDURE — 85610 PROTHROMBIN TIME: CPT

## 2023-11-01 ENCOUNTER — HOSPITAL ENCOUNTER (OUTPATIENT)
Age: 68
Discharge: HOME | End: 2023-11-04
Payer: MEDICARE

## 2023-11-01 ENCOUNTER — TRANSCRIBE ORDERS (OUTPATIENT)
Age: 68
End: 2023-11-01

## 2023-11-01 DIAGNOSIS — Z79.01 CHRONIC ANTICOAGULATION: ICD-10-CM

## 2023-11-01 DIAGNOSIS — E83.118 PRECIRRHOTIC HEMOCHROMATOSIS: Primary | ICD-10-CM

## 2023-11-01 DIAGNOSIS — E83.118 PRECIRRHOTIC HEMOCHROMATOSIS: ICD-10-CM

## 2023-11-01 LAB
BASOPHILS # BLD: 0.1 K/UL (ref 0–0.1)
BASOPHILS NFR BLD: 1 % (ref 0–2)
DIFFERENTIAL METHOD BLD: ABNORMAL
EOSINOPHIL # BLD: 0.2 K/UL (ref 0–0.4)
EOSINOPHIL NFR BLD: 4 % (ref 0–5)
ERYTHROCYTE [DISTWIDTH] IN BLOOD BY AUTOMATED COUNT: 13.7 % (ref 11.6–14.5)
FERRITIN SERPL-MCNC: 101 NG/ML (ref 8–388)
HCT VFR BLD AUTO: 44 % (ref 36–48)
HGB BLD-MCNC: 15.6 G/DL (ref 13–16)
IMM GRANULOCYTES # BLD AUTO: 0 K/UL (ref 0–0.04)
IMM GRANULOCYTES NFR BLD AUTO: 0 % (ref 0–0.5)
INR PPP: 1.7 (ref 0.9–1.1)
IRON SATN MFR SERPL: 49 % (ref 20–50)
IRON SERPL-MCNC: 149 UG/DL (ref 50–175)
LYMPHOCYTES # BLD: 1.4 K/UL (ref 0.9–3.6)
LYMPHOCYTES NFR BLD: 26 % (ref 21–52)
MCH RBC QN AUTO: 34.6 PG (ref 24–34)
MCHC RBC AUTO-ENTMCNC: 35.5 G/DL (ref 31–37)
MCV RBC AUTO: 97.6 FL (ref 78–100)
MONOCYTES # BLD: 0.6 K/UL (ref 0.05–1.2)
MONOCYTES NFR BLD: 11 % (ref 3–10)
NEUTS SEG # BLD: 3.1 K/UL (ref 1.8–8)
NEUTS SEG NFR BLD: 58 % (ref 40–73)
NRBC # BLD: 0 K/UL (ref 0–0.01)
NRBC BLD-RTO: 0 PER 100 WBC
PLATELET # BLD AUTO: 192 K/UL (ref 135–420)
PMV BLD AUTO: 11.2 FL (ref 9.2–11.8)
PROTHROMBIN TIME: 20.5 SEC (ref 11.9–14.7)
RBC # BLD AUTO: 4.51 M/UL (ref 4.35–5.65)
TIBC SERPL-MCNC: 306 UG/DL (ref 250–450)
TIBC SERPL-MCNC: 321 UG/DL (ref 250–450)
WBC # BLD AUTO: 5.3 K/UL (ref 4.6–13.2)

## 2023-11-01 PROCEDURE — 85025 COMPLETE CBC W/AUTO DIFF WBC: CPT

## 2023-11-01 PROCEDURE — 83550 IRON BINDING TEST: CPT

## 2023-11-01 PROCEDURE — 85610 PROTHROMBIN TIME: CPT

## 2023-11-01 PROCEDURE — 82728 ASSAY OF FERRITIN: CPT

## 2023-11-01 PROCEDURE — 83540 ASSAY OF IRON: CPT

## 2023-11-01 PROCEDURE — 36415 COLL VENOUS BLD VENIPUNCTURE: CPT

## 2023-11-02 ENCOUNTER — TELEPHONE (OUTPATIENT)
Facility: CLINIC | Age: 68
End: 2023-11-02

## 2023-11-02 NOTE — TELEPHONE ENCOUNTER
----- Message from Ritesh Cottrell MD sent at 11/1/2023  2:54 PM EDT -----  Can you call him an confirm his dosing of warfarin.  ----- Message -----  From: [facility] Incoming Lab For [facility]  Sent: 11/1/2023   9:03 AM EDT  To: Ritesh Cottrell MD      Called patient to speak about message from Marcos Brambila MD. Confirmed date of birth. Patient said he is taking warfarin 7.5 mg two days in a row and on the third day he takes warfarin 10 mg and then he repeats with 7.5 mg two days in a row and 10mg on third day.

## 2023-11-03 NOTE — TELEPHONE ENCOUNTER
Called patient to speak about message from Otho Aase, MD. Confirmed name and date of birth. Spoke with patient about message. Patient expressed understanding. Pt did not wish to up his dosage, states he may have missed a dose but he;s not sure, he preferred to get his INR rechecked, scheduled patient for next Friday.

## 2023-11-10 ENCOUNTER — NURSE ONLY (OUTPATIENT)
Facility: CLINIC | Age: 68
End: 2023-11-10

## 2023-11-10 DIAGNOSIS — D69.9 ANTICOAGULANT DISORDER (HCC): Primary | ICD-10-CM

## 2023-11-10 LAB
POC INR: 2.8
PROTHROMBIN TIME, POC: 33.8

## 2023-11-10 SDOH — ECONOMIC STABILITY: FOOD INSECURITY: WITHIN THE PAST 12 MONTHS, YOU WORRIED THAT YOUR FOOD WOULD RUN OUT BEFORE YOU GOT MONEY TO BUY MORE.: NEVER TRUE

## 2023-11-10 SDOH — ECONOMIC STABILITY: HOUSING INSECURITY
IN THE LAST 12 MONTHS, WAS THERE A TIME WHEN YOU DID NOT HAVE A STEADY PLACE TO SLEEP OR SLEPT IN A SHELTER (INCLUDING NOW)?: NO

## 2023-11-10 SDOH — ECONOMIC STABILITY: INCOME INSECURITY: HOW HARD IS IT FOR YOU TO PAY FOR THE VERY BASICS LIKE FOOD, HOUSING, MEDICAL CARE, AND HEATING?: NOT VERY HARD

## 2023-11-10 SDOH — ECONOMIC STABILITY: FOOD INSECURITY: WITHIN THE PAST 12 MONTHS, THE FOOD YOU BOUGHT JUST DIDN'T LAST AND YOU DIDN'T HAVE MONEY TO GET MORE.: NEVER TRUE

## 2023-11-10 NOTE — PROGRESS NOTES
Nigel Saravia. is a 76 y.o. male who presents today for Anticoagulation monitoring. Indication:  blood clots   INR Goal: 2.0-3.0. Current dose:  Coumadin  7.5 mg for two days and on the third day he takes 10 mg of coumadin then restarts the 7.5 mg for two days. Missed Coumadin Doses:  None  Medication Changes:  no  Dietary Changes:  no    Symptoms: taking coumadin appropriately without any bleeding. Latest INRs:  Lab Results   Component Value Date/Time    INR 1.7 11/01/2023 07:13 AM    INR 2.8 10/06/2023 09:14 AM    INR 2.9 09/01/2023 07:30 AM        New Coumadin dose: . Keep the same dose. Next check to be scheduled for  4 weeks. Patient verbalized understanding.

## 2023-11-24 RX ORDER — MELOXICAM 15 MG/1
15 TABLET ORAL DAILY
Qty: 90 TABLET | Refills: 3 | Status: SHIPPED | OUTPATIENT
Start: 2023-11-24

## 2023-11-24 RX ORDER — METOPROLOL SUCCINATE 100 MG/1
200 TABLET, EXTENDED RELEASE ORAL DAILY
Qty: 180 TABLET | Refills: 3 | Status: SHIPPED | OUTPATIENT
Start: 2023-11-24

## 2023-11-24 RX ORDER — LOSARTAN POTASSIUM 100 MG/1
100 TABLET ORAL DAILY
Qty: 90 TABLET | Refills: 3 | Status: SHIPPED | OUTPATIENT
Start: 2023-11-24

## 2023-11-27 ENCOUNTER — OFFICE VISIT (OUTPATIENT)
Facility: CLINIC | Age: 68
End: 2023-11-27
Payer: MEDICARE

## 2023-11-27 VITALS
TEMPERATURE: 98.4 F | BODY MASS INDEX: 28.36 KG/M2 | WEIGHT: 221 LBS | DIASTOLIC BLOOD PRESSURE: 95 MMHG | SYSTOLIC BLOOD PRESSURE: 145 MMHG | OXYGEN SATURATION: 99 % | HEIGHT: 74 IN | HEART RATE: 58 BPM

## 2023-11-27 DIAGNOSIS — Z79.01 ANTICOAGULATED ON COUMADIN: ICD-10-CM

## 2023-11-27 DIAGNOSIS — I10 ESSENTIAL HYPERTENSION: Primary | ICD-10-CM

## 2023-11-27 DIAGNOSIS — Z12.11 COLON CANCER SCREENING: ICD-10-CM

## 2023-11-27 PROBLEM — R10.9 LEFT FLANK PAIN: Status: RESOLVED | Noted: 2020-12-23 | Resolved: 2023-11-27

## 2023-11-27 PROCEDURE — 1123F ACP DISCUSS/DSCN MKR DOCD: CPT | Performed by: STUDENT IN AN ORGANIZED HEALTH CARE EDUCATION/TRAINING PROGRAM

## 2023-11-27 PROCEDURE — G8484 FLU IMMUNIZE NO ADMIN: HCPCS | Performed by: STUDENT IN AN ORGANIZED HEALTH CARE EDUCATION/TRAINING PROGRAM

## 2023-11-27 PROCEDURE — 3017F COLORECTAL CA SCREEN DOC REV: CPT | Performed by: STUDENT IN AN ORGANIZED HEALTH CARE EDUCATION/TRAINING PROGRAM

## 2023-11-27 PROCEDURE — 3077F SYST BP >= 140 MM HG: CPT | Performed by: STUDENT IN AN ORGANIZED HEALTH CARE EDUCATION/TRAINING PROGRAM

## 2023-11-27 PROCEDURE — 1036F TOBACCO NON-USER: CPT | Performed by: STUDENT IN AN ORGANIZED HEALTH CARE EDUCATION/TRAINING PROGRAM

## 2023-11-27 PROCEDURE — 99214 OFFICE O/P EST MOD 30 MIN: CPT | Performed by: STUDENT IN AN ORGANIZED HEALTH CARE EDUCATION/TRAINING PROGRAM

## 2023-11-27 PROCEDURE — G8417 CALC BMI ABV UP PARAM F/U: HCPCS | Performed by: STUDENT IN AN ORGANIZED HEALTH CARE EDUCATION/TRAINING PROGRAM

## 2023-11-27 PROCEDURE — G8427 DOCREV CUR MEDS BY ELIG CLIN: HCPCS | Performed by: STUDENT IN AN ORGANIZED HEALTH CARE EDUCATION/TRAINING PROGRAM

## 2023-11-27 PROCEDURE — 3079F DIAST BP 80-89 MM HG: CPT | Performed by: STUDENT IN AN ORGANIZED HEALTH CARE EDUCATION/TRAINING PROGRAM

## 2023-11-27 RX ORDER — WARFARIN SODIUM 7.5 MG/1
7.5 TABLET ORAL DAILY
Qty: 90 TABLET | Refills: 0 | Status: SHIPPED | OUTPATIENT
Start: 2023-11-27

## 2023-11-27 RX ORDER — WARFARIN SODIUM 5 MG/1
TABLET ORAL
Qty: 90 TABLET | Refills: 0 | Status: SHIPPED | OUTPATIENT
Start: 2023-11-27

## 2023-11-27 ASSESSMENT — PATIENT HEALTH QUESTIONNAIRE - PHQ9
2. FEELING DOWN, DEPRESSED OR HOPELESS: 0
SUM OF ALL RESPONSES TO PHQ QUESTIONS 1-9: 0
SUM OF ALL RESPONSES TO PHQ9 QUESTIONS 1 & 2: 0
SUM OF ALL RESPONSES TO PHQ QUESTIONS 1-9: 0
1. LITTLE INTEREST OR PLEASURE IN DOING THINGS: 0

## 2023-11-27 NOTE — PROGRESS NOTES
Seng Moya. presents today for   Chief Complaint   Patient presents with    6 Month Follow-Up     Routine 6 month follow up patient has no new acute concerns at this time. Is someone accompanying this pt? No    Is the patient using any DME equipment during OV? No     Health Maintenance reviewed and discussed and ordered per Provider. Health Maintenance Due   Topic Date Due    DTaP/Tdap/Td vaccine (1 - Tdap) Never done    Shingles vaccine (1 of 2) Never done    Pneumococcal 65+ years Vaccine (1 - PCV) Never done    Flu vaccine (1) 08/01/2023    COVID-19 Vaccine (4 - 2023-24 season) 09/01/2023    Colorectal Cancer Screen  12/10/2023   . Coordination of Care:  1. \"Have you been to the ER, urgent care clinic since your last visit? Hospitalized since your last visit? \" No    2. \"Have you seen or consulted any other health care providers outside of the [address] [place] [address] since your last visit? \" No    3. For patients aged 43-73: Has the patient had a colonoscopy?  Yes- No care gap present

## 2023-11-27 NOTE — PROGRESS NOTES
Subjective:   Fernie Jacques is a 76 y.o. male who was seen for 6 Month Follow-Up (Routine 6 month follow up patient has no new acute concerns at this time. )    He alternates warfarin dose 7.5 mg, 7.5 mg, 10 mg. His INR was normal 2 weeks ago. Has not changed his diet or medication dosing. He is he is due for colon cancer screening. Had a letter in the mail. Would like to get set up in [place] for this. Prior to Admission medications    Medication Sig Start Date End Date Taking? Authorizing Provider   warfarin (COUMADIN) 5 MG tablet TAKE 2 TABLETS BY MOUTH EVERY THIRD DAY. 11/27/23  Yes Carole Hay MD   warfarin (COUMADIN) 7.5 MG tablet TAKE 1 TABLET BY MOUTH DAILY. 11/27/23  Yes Carole Hay MD   meloxicam (MOBIC) 15 MG tablet TAKE 1 TABLET BY MOUTH DAILY. 11/24/23  Yes Carole Hay MD   losartan (COZAAR) 100 MG tablet TAKE 1 TABLET BY MOUTH DAILY. 11/24/23  Yes Carole Hay MD   metoprolol succinate (TOPROL XL) 100 MG extended release tablet TAKE 2 TABLETS BY MOUTH DAILY. 11/24/23  Yes Carole Hay MD   tamsulosin (FLOMAX) 0.4 MG capsule TAKE 2 CAPSULES BY MOUTH DAILY. 8/25/23  Yes Carole Hay MD   amLODIPine (NORVASC) 5 MG tablet TAKE 1 TABLET BY MOUTH DAILY. 8/25/23  Yes Carole Hay MD   pantoprazole (PROTONIX) 40 MG tablet TAKE 1 TABLET BY MOUTH DAILY.  8/25/23  Yes Carole Hay MD   cyanocobalamin 1000 MCG tablet Take 1 tablet by mouth daily   Yes Automatic Reconciliation, Ar   loratadine (CLARITIN) 10 MG tablet Take 1 tablet by mouth   Yes Automatic Reconciliation, Ar   sildenafil (VIAGRA) 100 MG tablet TAKE 1 TABLET BY MOUTH ONCE DAILY AS NEEDED FOR 4 DAYS. 1/12/23  Yes Automatic Reconciliation, Ar     Allergies   Allergen Reactions    Sulfa Antibiotics Other (See Comments)     Social History     Tobacco Use    Smoking status: Former     Packs/day: 0.50     Years: 25.00     Additional pack years: 0.00     Total pack years: 12.50

## 2023-11-28 ENCOUNTER — TELEPHONE (OUTPATIENT)
Age: 68
End: 2023-11-28

## 2023-11-30 NOTE — TELEPHONE ENCOUNTER
Referral  Referral # [id number]  Referral Information    Referral # Creation Date Referral Status Status Update    [id number] 11/27/2023 In Progress 11/28/2023: Status History     Status Reason Referral Type Referral Reasons Referral Class   none Eval and Treat Specialty Services Required Internal     To Specialty To Provider To Location/Place of Service To Department   Gastroenterology Nemesio Pritchard MD none [facility] - GASTROENTEROLOGY     To Vendor Referred By By Location/Place of Service By Department   none Trip Freitas MD [facility]  [facility]     Priority Start Date Expiration Date Referral Entered By   Routine 11/27/2023 11/26/2024 Trip Freitas MD     Visits Requested Visits Authorized Visits Completed Visits Scheduled   2 2       [address] Required? Covered? Member # Authorized From Expires Auth # Precert.  # Comment   MEDICARE MEDICARE PART A AND B -- Covered [id number] 8/1/2020 -- -- -- --   [employer] [place] [employer] [place] [employer] [place]PP -- Covered [id number] 8/1/2020 -- -- -- --     Referral Information    Referral # Creation Date Referral Status Status Update    [id number] 11/27/2023 In Progress 11/28/2023: Status History     Status Reason Referral Type Referral Reasons Referral Class   none Eval and Treat Specialty Services Required Internal     To Specialty To Provider To Location/Place of Service To Department   Gastroenterology Nemesio Pritchard MD none [facility] - GASTROENTEROLOGY     To Vendor Referred By By Location/Place of Service By Department   none Trip Freitas MD [facility] [address] [facility]     Priority Start Date Expiration Date Referral Entered By   Routine 11/27/2023 11/26/2024 Trip Freitas MD     Visits Requested Visits Authorized Visits Completed Visits Scheduled   2 2       Procedure

## 2023-12-04 ENCOUNTER — HOSPITAL ENCOUNTER (OUTPATIENT)
Age: 68
Discharge: HOME | End: 2023-12-07
Payer: MEDICARE

## 2023-12-04 ENCOUNTER — NURSE ONLY (OUTPATIENT)
Facility: CLINIC | Age: 68
End: 2023-12-04
Payer: MEDICARE

## 2023-12-04 ENCOUNTER — TELEPHONE (OUTPATIENT)
Facility: CLINIC | Age: 68
End: 2023-12-04

## 2023-12-04 DIAGNOSIS — Z79.01 CHRONIC ANTICOAGULATION: ICD-10-CM

## 2023-12-04 DIAGNOSIS — Z79.01 ANTICOAGULATED ON COUMADIN: Primary | ICD-10-CM

## 2023-12-04 LAB
INR PPP: 2.6 (ref 0.9–1.1)
POC INR: 3.2
PROTHROMBIN TIME, POC: 38.1
PROTHROMBIN TIME: 28.3 SEC (ref 11.9–14.7)

## 2023-12-04 PROCEDURE — 99211 OFF/OP EST MAY X REQ PHY/QHP: CPT | Performed by: STUDENT IN AN ORGANIZED HEALTH CARE EDUCATION/TRAINING PROGRAM

## 2023-12-04 PROCEDURE — 36415 COLL VENOUS BLD VENIPUNCTURE: CPT

## 2023-12-04 PROCEDURE — 85610 PROTHROMBIN TIME: CPT

## 2023-12-04 PROCEDURE — 85610 PROTHROMBIN TIME: CPT | Performed by: STUDENT IN AN ORGANIZED HEALTH CARE EDUCATION/TRAINING PROGRAM

## 2023-12-04 NOTE — TELEPHONE ENCOUNTER
Patient called stating he came in to get is INR checked but apparently our machine is way off. He said the Dr has him changing his medication but because the machine is off she probably  wouldn't want him to do this now. He would like to have the Dr call him back to discuss this.     Call back number is [phone]

## 2023-12-04 NOTE — TELEPHONE ENCOUNTER
Called patient to speak about message from Sanaz Ramírez MD. Confirmed name and date of birth. Spoke with patient about message. Patient expressed understanding. He is not to change his warfarin schedule and see the lab in one month for recheck.

## 2023-12-04 NOTE — PROGRESS NOTES
Taiwo Gordillo is a 76 y.o. male who presents today for Anticoagulation monitoring. Indication:  blood clots  INR Goal: 2.0-3.0. Current dose:  Coumadin 7.5 mg daily for two days then on third day he takes 10 mg. Missed Coumadin Doses:  None  Medication Changes:  no  Dietary Changes:  no    Symptoms: taking coumadin appropriately without any bleeding. Latest INRs:  Lab Results   Component Value Date/Time    INR 3.2 12/04/2023 01:50 PM    INR 2.8 11/10/2023 02:15 PM    INR 1.7 11/01/2023 07:13 AM    INR 2.8 10/06/2023 09:14 AM    INR 2.9 09/01/2023 07:30 AM        New Coumadin dose:. . take 7.5 daily     Next check to be scheduled for 1 week he is going to the lab to have this done. Patient verbalized understanding.

## 2023-12-05 ENCOUNTER — SCHEDULED TELEPHONE ENCOUNTER (OUTPATIENT)
Age: 68
End: 2023-12-05

## 2023-12-05 DIAGNOSIS — Z12.11 SCREEN FOR COLON CANCER: Primary | ICD-10-CM

## 2024-01-03 ENCOUNTER — HOSPITAL ENCOUNTER (OUTPATIENT)
Age: 69
Discharge: HOME | End: 2024-01-06
Payer: MEDICARE

## 2024-01-03 DIAGNOSIS — Z79.01 CHRONIC ANTICOAGULATION: ICD-10-CM

## 2024-01-03 LAB
INR PPP: 2.7 (ref 0.9–1.1)
PROTHROMBIN TIME: 28.8 SEC (ref 11.9–14.7)

## 2024-01-03 PROCEDURE — 36415 COLL VENOUS BLD VENIPUNCTURE: CPT

## 2024-01-03 PROCEDURE — 85610 PROTHROMBIN TIME: CPT

## 2024-02-02 ENCOUNTER — HOSPITAL ENCOUNTER (OUTPATIENT)
Age: 69
End: 2024-02-02
Payer: MEDICARE

## 2024-02-02 DIAGNOSIS — Z79.01 CHRONIC ANTICOAGULATION: ICD-10-CM

## 2024-02-02 LAB
INR PPP: 3.3 (ref 0.9–1.1)
PROTHROMBIN TIME: 33.6 SEC (ref 11.9–14.7)

## 2024-02-02 PROCEDURE — 36415 COLL VENOUS BLD VENIPUNCTURE: CPT

## 2024-02-02 PROCEDURE — 85610 PROTHROMBIN TIME: CPT

## 2024-02-12 ENCOUNTER — HOSPITAL ENCOUNTER (OUTPATIENT)
Age: 69
Discharge: HOME | End: 2024-02-15
Payer: MEDICARE

## 2024-02-12 DIAGNOSIS — Z79.01 CHRONIC ANTICOAGULATION: ICD-10-CM

## 2024-02-12 LAB
INR PPP: 2.5 (ref 0.9–1.1)
PROTHROMBIN TIME: 27.6 SEC (ref 11.9–14.7)

## 2024-02-12 PROCEDURE — 85610 PROTHROMBIN TIME: CPT

## 2024-02-12 PROCEDURE — 36415 COLL VENOUS BLD VENIPUNCTURE: CPT

## 2024-02-15 RX ORDER — WARFARIN SODIUM 7.5 MG/1
7.5 TABLET ORAL DAILY
Qty: 90 TABLET | Refills: 0 | Status: SHIPPED | OUTPATIENT
Start: 2024-02-15

## 2024-02-22 RX ORDER — TAMSULOSIN HYDROCHLORIDE 0.4 MG/1
CAPSULE ORAL
Qty: 180 CAPSULE | Refills: 1 | Status: SHIPPED | OUTPATIENT
Start: 2024-02-22

## 2024-02-22 RX ORDER — PANTOPRAZOLE SODIUM 40 MG/1
TABLET, DELAYED RELEASE ORAL
Qty: 90 TABLET | Refills: 1 | Status: SHIPPED | OUTPATIENT
Start: 2024-02-22

## 2024-02-22 RX ORDER — WARFARIN SODIUM 7.5 MG/1
7.5 TABLET ORAL DAILY
Qty: 90 TABLET | Refills: 0 | Status: SHIPPED | OUTPATIENT
Start: 2024-02-22

## 2024-02-22 RX ORDER — AMLODIPINE BESYLATE 5 MG/1
TABLET ORAL
Qty: 90 TABLET | Refills: 1 | Status: SHIPPED | OUTPATIENT
Start: 2024-02-22

## 2024-03-04 ENCOUNTER — HOSPITAL ENCOUNTER (OUTPATIENT)
Age: 69
Discharge: HOME | End: 2024-03-07
Payer: MEDICARE

## 2024-03-04 ENCOUNTER — TRANSCRIBE ORDERS (OUTPATIENT)
Age: 69
End: 2024-03-04

## 2024-03-04 ENCOUNTER — TELEPHONE (OUTPATIENT)
Facility: CLINIC | Age: 69
End: 2024-03-04

## 2024-03-04 DIAGNOSIS — Z79.01 LONG TERM (CURRENT) USE OF ANTICOAGULANTS: ICD-10-CM

## 2024-03-04 DIAGNOSIS — Z79.01 LONG TERM (CURRENT) USE OF ANTICOAGULANTS: Primary | ICD-10-CM

## 2024-03-04 LAB
INR PPP: 2.5 (ref 0.9–1.1)
PROTHROMBIN TIME: 27.4 SEC (ref 11.9–14.7)

## 2024-03-04 PROCEDURE — 36415 COLL VENOUS BLD VENIPUNCTURE: CPT

## 2024-03-04 PROCEDURE — 85610 PROTHROMBIN TIME: CPT

## 2024-03-04 NOTE — TELEPHONE ENCOUNTER
Patient came into the office needing his pt/ INR order to be re-ordered. The hospital said its in the wrong spot ?

## 2024-04-01 ENCOUNTER — TRANSCRIBE ORDERS (OUTPATIENT)
Age: 69
End: 2024-04-01

## 2024-04-01 ENCOUNTER — HOSPITAL ENCOUNTER (OUTPATIENT)
Age: 69
Discharge: HOME | End: 2024-04-04
Payer: MEDICARE

## 2024-04-01 DIAGNOSIS — Z79.01 LONG TERM (CURRENT) USE OF ANTICOAGULANTS: ICD-10-CM

## 2024-04-01 DIAGNOSIS — Z79.01 LONG TERM (CURRENT) USE OF ANTICOAGULANTS: Primary | ICD-10-CM

## 2024-04-01 LAB
INR PPP: 2.8 (ref 0.9–1.1)
PROTHROMBIN TIME: 29.3 SEC (ref 11.9–14.7)

## 2024-04-01 PROCEDURE — 85610 PROTHROMBIN TIME: CPT

## 2024-04-01 PROCEDURE — 36415 COLL VENOUS BLD VENIPUNCTURE: CPT

## 2024-05-06 ENCOUNTER — TELEPHONE (OUTPATIENT)
Facility: CLINIC | Age: 69
End: 2024-05-06

## 2024-05-06 ENCOUNTER — HOSPITAL ENCOUNTER (OUTPATIENT)
Age: 69
Discharge: HOME | End: 2024-05-09
Payer: MEDICARE

## 2024-05-06 DIAGNOSIS — Z79.01 ANTICOAGULATED ON COUMADIN: ICD-10-CM

## 2024-05-06 DIAGNOSIS — Z79.01 ANTICOAGULATED ON COUMADIN: Primary | ICD-10-CM

## 2024-05-06 LAB
INR PPP: 2.5 (ref 0.9–1.1)
PROTHROMBIN TIME: 26.8 SEC (ref 11.9–14.7)

## 2024-05-06 PROCEDURE — 36415 COLL VENOUS BLD VENIPUNCTURE: CPT

## 2024-05-06 PROCEDURE — 85610 PROTHROMBIN TIME: CPT

## 2024-05-15 ENCOUNTER — TRANSCRIBE ORDERS (OUTPATIENT)
Age: 69
End: 2024-05-15

## 2024-05-15 ENCOUNTER — HOSPITAL ENCOUNTER (OUTPATIENT)
Age: 69
Discharge: HOME | End: 2024-05-18
Payer: MEDICARE

## 2024-05-15 DIAGNOSIS — E83.118 PRECIRRHOTIC HEMOCHROMATOSIS: Primary | ICD-10-CM

## 2024-05-15 DIAGNOSIS — E83.118 PRECIRRHOTIC HEMOCHROMATOSIS: ICD-10-CM

## 2024-05-15 LAB
BASOPHILS # BLD: 0.1 K/UL (ref 0–0.1)
BASOPHILS NFR BLD: 1 % (ref 0–2)
DIFFERENTIAL METHOD BLD: ABNORMAL
EOSINOPHIL # BLD: 0.3 K/UL (ref 0–0.4)
EOSINOPHIL NFR BLD: 3 % (ref 0–5)
ERYTHROCYTE [DISTWIDTH] IN BLOOD BY AUTOMATED COUNT: 12.3 % (ref 11.6–14.5)
FERRITIN SERPL-MCNC: 170 NG/ML (ref 8–388)
HCT VFR BLD AUTO: 47.3 % (ref 36–48)
HGB BLD-MCNC: 16.3 G/DL (ref 13–16)
IMM GRANULOCYTES # BLD AUTO: 0 K/UL (ref 0–0.04)
IMM GRANULOCYTES NFR BLD AUTO: 0 % (ref 0–0.5)
IRON SATN MFR SERPL: 60 % (ref 20–50)
IRON SERPL-MCNC: 181 UG/DL (ref 50–175)
LYMPHOCYTES # BLD: 2 K/UL (ref 0.9–3.6)
LYMPHOCYTES NFR BLD: 25 % (ref 21–52)
MCH RBC QN AUTO: 32.1 PG (ref 24–34)
MCHC RBC AUTO-ENTMCNC: 34.5 G/DL (ref 31–37)
MCV RBC AUTO: 93.1 FL (ref 78–100)
MONOCYTES # BLD: 0.8 K/UL (ref 0.05–1.2)
MONOCYTES NFR BLD: 10 % (ref 3–10)
NEUTS SEG # BLD: 5.1 K/UL (ref 1.8–8)
NEUTS SEG NFR BLD: 61 % (ref 40–73)
NRBC # BLD: 0 K/UL (ref 0–0.01)
NRBC BLD-RTO: 0 PER 100 WBC
PLATELET # BLD AUTO: 184 K/UL (ref 135–420)
PMV BLD AUTO: 11.4 FL (ref 9.2–11.8)
RBC # BLD AUTO: 5.08 M/UL (ref 4.35–5.65)
TIBC SERPL-MCNC: 301 UG/DL (ref 250–450)
WBC # BLD AUTO: 8.3 K/UL (ref 4.6–13.2)

## 2024-05-15 PROCEDURE — 36415 COLL VENOUS BLD VENIPUNCTURE: CPT

## 2024-05-15 PROCEDURE — 85025 COMPLETE CBC W/AUTO DIFF WBC: CPT

## 2024-05-15 PROCEDURE — 83540 ASSAY OF IRON: CPT

## 2024-05-15 PROCEDURE — 82728 ASSAY OF FERRITIN: CPT

## 2024-05-23 RX ORDER — TAMSULOSIN HYDROCHLORIDE 0.4 MG/1
CAPSULE ORAL
Qty: 180 CAPSULE | Refills: 1 | Status: SHIPPED | OUTPATIENT
Start: 2024-05-23

## 2024-05-23 RX ORDER — AMLODIPINE BESYLATE 5 MG/1
TABLET ORAL
Qty: 90 TABLET | Refills: 1 | Status: SHIPPED | OUTPATIENT
Start: 2024-05-23

## 2024-06-03 ENCOUNTER — HOSPITAL ENCOUNTER (OUTPATIENT)
Age: 69
Discharge: HOME | End: 2024-06-06
Payer: MEDICARE

## 2024-06-03 DIAGNOSIS — Z79.01 ANTICOAGULATED ON COUMADIN: ICD-10-CM

## 2024-06-03 LAB
INR PPP: 2.5 (ref 0.9–1.1)
PROTHROMBIN TIME: 26.8 SEC (ref 11.9–14.7)

## 2024-06-03 PROCEDURE — 36415 COLL VENOUS BLD VENIPUNCTURE: CPT

## 2024-06-03 PROCEDURE — 85610 PROTHROMBIN TIME: CPT

## 2024-07-03 ENCOUNTER — TELEPHONE (OUTPATIENT)
Facility: CLINIC | Age: 69
End: 2024-07-03

## 2024-07-03 DIAGNOSIS — Z79.01 ANTICOAGULATED ON COUMADIN: Primary | ICD-10-CM

## 2024-07-05 ENCOUNTER — HOSPITAL ENCOUNTER (OUTPATIENT)
Age: 69
End: 2024-07-05
Payer: MEDICARE

## 2024-07-05 DIAGNOSIS — Z79.01 ANTICOAGULATED ON COUMADIN: ICD-10-CM

## 2024-07-05 LAB
INR PPP: 2.7 (ref 0.9–1.1)
PROTHROMBIN TIME: 29.2 SEC (ref 11.9–14.9)

## 2024-07-05 PROCEDURE — 85610 PROTHROMBIN TIME: CPT

## 2024-07-05 PROCEDURE — 36415 COLL VENOUS BLD VENIPUNCTURE: CPT

## 2024-08-01 ENCOUNTER — HOSPITAL ENCOUNTER (OUTPATIENT)
Age: 69
Discharge: HOME | End: 2024-08-01
Payer: MEDICARE

## 2024-08-01 DIAGNOSIS — Z79.01 ANTICOAGULATED ON COUMADIN: ICD-10-CM

## 2024-08-01 LAB
INR PPP: 2.6 (ref 0.9–1.1)
PROTHROMBIN TIME: 28.1 SEC (ref 11.9–14.9)

## 2024-08-01 PROCEDURE — 36415 COLL VENOUS BLD VENIPUNCTURE: CPT

## 2024-08-01 PROCEDURE — 85610 PROTHROMBIN TIME: CPT

## 2024-08-20 RX ORDER — METOPROLOL SUCCINATE 100 MG/1
200 TABLET, EXTENDED RELEASE ORAL DAILY
Qty: 180 TABLET | Refills: 1 | Status: SHIPPED | OUTPATIENT
Start: 2024-08-20

## 2024-08-20 RX ORDER — WARFARIN SODIUM 7.5 MG/1
7.5 TABLET ORAL DAILY
Qty: 90 TABLET | Refills: 1 | Status: SHIPPED | OUTPATIENT
Start: 2024-08-20

## 2024-08-20 RX ORDER — PANTOPRAZOLE SODIUM 40 MG/1
40 TABLET, DELAYED RELEASE ORAL DAILY
Qty: 90 TABLET | Refills: 1 | Status: SHIPPED | OUTPATIENT
Start: 2024-08-20

## 2024-08-29 ENCOUNTER — HOSPITAL ENCOUNTER (OUTPATIENT)
Age: 69
Discharge: HOME | End: 2024-08-29
Payer: MEDICARE

## 2024-08-29 ENCOUNTER — TRANSCRIBE ORDERS (OUTPATIENT)
Age: 69
End: 2024-08-29

## 2024-08-29 DIAGNOSIS — E83.118 PRECIRRHOTIC HEMOCHROMATOSIS: ICD-10-CM

## 2024-08-29 DIAGNOSIS — E83.118 PRECIRRHOTIC HEMOCHROMATOSIS: Primary | ICD-10-CM

## 2024-08-29 LAB
BASOPHILS # BLD: 0 K/UL (ref 0–0.1)
BASOPHILS NFR BLD: 0 % (ref 0–2)
DIFFERENTIAL METHOD BLD: ABNORMAL
EOSINOPHIL # BLD: 0.2 K/UL (ref 0–0.4)
EOSINOPHIL NFR BLD: 3 % (ref 0–5)
ERYTHROCYTE [DISTWIDTH] IN BLOOD BY AUTOMATED COUNT: 12.3 % (ref 11.6–14.5)
FERRITIN SERPL-MCNC: 61 NG/ML (ref 8–388)
HCT VFR BLD AUTO: 45 % (ref 36–48)
HGB BLD-MCNC: 15.9 G/DL (ref 13–16)
IMM GRANULOCYTES # BLD AUTO: 0 K/UL (ref 0–0.04)
IMM GRANULOCYTES NFR BLD AUTO: 0 % (ref 0–0.5)
IRON SATN MFR SERPL: 53 % (ref 20–50)
IRON SERPL-MCNC: 162 UG/DL (ref 50–175)
LYMPHOCYTES # BLD: 2 K/UL (ref 0.9–3.6)
LYMPHOCYTES NFR BLD: 27 % (ref 21–52)
MCH RBC QN AUTO: 33 PG (ref 24–34)
MCHC RBC AUTO-ENTMCNC: 35.3 G/DL (ref 31–37)
MCV RBC AUTO: 93.4 FL (ref 78–100)
MONOCYTES # BLD: 0.9 K/UL (ref 0.05–1.2)
MONOCYTES NFR BLD: 12 % (ref 3–10)
NEUTS SEG # BLD: 4.4 K/UL (ref 1.8–8)
NEUTS SEG NFR BLD: 58 % (ref 40–73)
NRBC # BLD: 0 K/UL (ref 0–0.01)
NRBC BLD-RTO: 0 PER 100 WBC
PLATELET # BLD AUTO: 198 K/UL (ref 135–420)
PMV BLD AUTO: 11.1 FL (ref 9.2–11.8)
RBC # BLD AUTO: 4.82 M/UL (ref 4.35–5.65)
TIBC SERPL-MCNC: 303 UG/DL (ref 250–450)
WBC # BLD AUTO: 7.5 K/UL (ref 4.6–13.2)

## 2024-08-29 PROCEDURE — 85025 COMPLETE CBC W/AUTO DIFF WBC: CPT

## 2024-08-29 PROCEDURE — 83540 ASSAY OF IRON: CPT

## 2024-08-29 PROCEDURE — 82728 ASSAY OF FERRITIN: CPT

## 2024-08-29 PROCEDURE — 36415 COLL VENOUS BLD VENIPUNCTURE: CPT

## 2024-09-09 ENCOUNTER — HOSPITAL ENCOUNTER (OUTPATIENT)
Age: 69
Discharge: HOME | End: 2024-09-12
Payer: MEDICARE

## 2024-09-09 DIAGNOSIS — Z79.01 ANTICOAGULATED ON COUMADIN: ICD-10-CM

## 2024-09-09 LAB
INR PPP: 3.3 (ref 0.9–1.1)
PROTHROMBIN TIME: 34 SEC (ref 11.9–14.9)

## 2024-09-09 PROCEDURE — 85610 PROTHROMBIN TIME: CPT

## 2024-09-09 PROCEDURE — 36415 COLL VENOUS BLD VENIPUNCTURE: CPT

## 2024-09-30 RX ORDER — SILDENAFIL 100 MG/1
TABLET, FILM COATED ORAL
Qty: 4 TABLET | Refills: 1 | Status: SHIPPED | OUTPATIENT
Start: 2024-09-30

## 2024-10-01 ENCOUNTER — HOSPITAL ENCOUNTER (OUTPATIENT)
Age: 69
Setting detail: SPECIMEN
Discharge: HOME | End: 2024-10-04
Payer: MEDICARE

## 2024-10-01 DIAGNOSIS — Z79.01 ANTICOAGULATED ON COUMADIN: ICD-10-CM

## 2024-10-01 LAB
INR PPP: 2.2 (ref 0.9–1.1)
PROTHROMBIN TIME: 24.6 SEC (ref 11.9–14.9)

## 2024-10-01 PROCEDURE — 36415 COLL VENOUS BLD VENIPUNCTURE: CPT

## 2024-10-01 PROCEDURE — 85610 PROTHROMBIN TIME: CPT

## 2024-11-01 ENCOUNTER — HOSPITAL ENCOUNTER (OUTPATIENT)
Age: 69
Setting detail: SPECIMEN
Discharge: HOME | End: 2024-11-04
Payer: MEDICARE

## 2024-11-01 DIAGNOSIS — Z79.01 ANTICOAGULATED ON COUMADIN: ICD-10-CM

## 2024-11-01 LAB
INR PPP: 2.7 (ref 0.9–1.1)
PROTHROMBIN TIME: 28.7 SEC (ref 11.9–14.9)

## 2024-11-01 PROCEDURE — 85610 PROTHROMBIN TIME: CPT

## 2024-11-01 PROCEDURE — 36415 COLL VENOUS BLD VENIPUNCTURE: CPT

## 2024-11-25 RX ORDER — SILDENAFIL 100 MG/1
100 TABLET, FILM COATED ORAL DAILY PRN
Qty: 4 TABLET | Refills: 1 | Status: SHIPPED | OUTPATIENT
Start: 2024-11-25

## 2024-12-02 ENCOUNTER — TELEPHONE (OUTPATIENT)
Facility: CLINIC | Age: 69
End: 2024-12-02

## 2024-12-02 RX ORDER — MELOXICAM 15 MG/1
15 TABLET ORAL DAILY
Qty: 90 TABLET | Refills: 0 | Status: SHIPPED | OUTPATIENT
Start: 2024-12-02

## 2024-12-02 RX ORDER — LOSARTAN POTASSIUM 100 MG/1
100 TABLET ORAL DAILY
Qty: 90 TABLET | Refills: 0 | Status: SHIPPED | OUTPATIENT
Start: 2024-12-02

## 2024-12-02 NOTE — TELEPHONE ENCOUNTER
Patient in office requesting refills to be sent to [facility] pharmacy.

## 2024-12-02 NOTE — TELEPHONE ENCOUNTER
Patient is needing lab order for INR to be placed. Asking if it could be set up for 6 months in advanced so he doesn't have to keeping coming into the office to request the order.     [phone] ([phone])

## 2025-01-02 ENCOUNTER — OFFICE VISIT (OUTPATIENT)
Facility: CLINIC | Age: 70
End: 2025-01-02

## 2025-01-02 VITALS
DIASTOLIC BLOOD PRESSURE: 81 MMHG | SYSTOLIC BLOOD PRESSURE: 137 MMHG | OXYGEN SATURATION: 95 % | HEART RATE: 81 BPM | RESPIRATION RATE: 19 BRPM | TEMPERATURE: 97.4 F | BODY MASS INDEX: 27.31 KG/M2 | HEIGHT: 74 IN | WEIGHT: 212.8 LBS

## 2025-01-02 DIAGNOSIS — Z79.01 CHRONIC ANTICOAGULATION: ICD-10-CM

## 2025-01-02 DIAGNOSIS — E83.110 PRIMARY HEMOCHROMATOSIS (HCC): ICD-10-CM

## 2025-01-02 DIAGNOSIS — Z12.5 ENCOUNTER FOR PROSTATE CANCER SCREENING: ICD-10-CM

## 2025-01-02 DIAGNOSIS — Z00.00 MEDICARE ANNUAL WELLNESS VISIT, SUBSEQUENT: Primary | ICD-10-CM

## 2025-01-02 DIAGNOSIS — Z85.828 HISTORY OF BASAL CELL CARCINOMA (BCC): ICD-10-CM

## 2025-01-02 DIAGNOSIS — Z86.711 HISTORY OF PULMONARY EMBOLUS (PE): ICD-10-CM

## 2025-01-02 DIAGNOSIS — I10 ESSENTIAL HYPERTENSION: ICD-10-CM

## 2025-01-02 DIAGNOSIS — M15.0 PRIMARY OSTEOARTHRITIS INVOLVING MULTIPLE JOINTS: ICD-10-CM

## 2025-01-02 PROBLEM — H26.9 CATARACT: Status: RESOLVED | Noted: 2022-06-22 | Resolved: 2025-01-02

## 2025-01-02 PROBLEM — R05.3 CHRONIC COUGH: Status: RESOLVED | Noted: 2017-07-07 | Resolved: 2025-01-02

## 2025-01-02 PROBLEM — E83.118 OTHER HEMOCHROMATOSIS: Status: ACTIVE | Noted: 2025-01-02

## 2025-01-02 RX ORDER — TAMSULOSIN HYDROCHLORIDE 0.4 MG/1
0.4 CAPSULE ORAL DAILY
Qty: 90 CAPSULE | Refills: 1 | Status: SHIPPED | OUTPATIENT
Start: 2025-01-02

## 2025-01-02 ASSESSMENT — PATIENT HEALTH QUESTIONNAIRE - PHQ9
SUM OF ALL RESPONSES TO PHQ QUESTIONS 1-9: 0
SUM OF ALL RESPONSES TO PHQ QUESTIONS 1-9: 0
2. FEELING DOWN, DEPRESSED OR HOPELESS: NOT AT ALL
SUM OF ALL RESPONSES TO PHQ QUESTIONS 1-9: 0
SUM OF ALL RESPONSES TO PHQ9 QUESTIONS 1 & 2: 0
1. LITTLE INTEREST OR PLEASURE IN DOING THINGS: NOT AT ALL
SUM OF ALL RESPONSES TO PHQ QUESTIONS 1-9: 0

## 2025-01-02 ASSESSMENT — LIFESTYLE VARIABLES
HOW MANY STANDARD DRINKS CONTAINING ALCOHOL DO YOU HAVE ON A TYPICAL DAY: PATIENT DOES NOT DRINK
HOW OFTEN DO YOU HAVE A DRINK CONTAINING ALCOHOL: NEVER

## 2025-01-02 NOTE — PROGRESS NOTES
Joshua Briones Jr. presents today for   Chief Complaint   Patient presents with    Medicare AWV    New Patient     Patient is here to establish care with provider. Former Prasanth patient.  Patient hasn't able to get labs done.       Is someone accompanying this pt? yes    Is the patient using any DME equipment during OV? no    Risk Factor Screenings with Interventions     Fall Risk:  2 or more falls in past year?: no  Fall with injury in past year?: no    Alcohol:  Alcohol Use: Not At Risk (1/2/2025)    AUDIT-C     Frequency of Alcohol Consumption: Never     Average Number of Drinks: Patient does not drink     Frequency of Binge Drinking: Never       Depression:  PHQ-2 Score: 0    Cognitive:  Clock Drawing Test (CDT): Normal  Words recalled: 3 Words Recalled  Total Score: 5  Total Score Interpretation: Normal Mini-Cog    Health Risk Assessment:     General  In general, how would you say your health is?: Good  In the past 7 days, have you experienced any of the following: New or Increased Pain, New or Increased Fatigue, Loneliness, Social Isolation, Stress or Anger?: No      Health Habits/Nutrition  On average, how many days per week do you engage in moderate to strenuous exercise (like a brisk walk)?: 0 days  On average, how many minutes do you engage in exercise at this level?: 0 min  Do you eat balanced/healthy meals regularly?: (!) No  Have you seen the dentist within the past year?: Yes  Body mass index is 27.32 kg/m².      Hearing/Vision  Have you had an eye exam within the past year?: Yes  Do you have difficulty driving, watching TV, or doing any of your daily activities because of your eyesight?: No  Do you or your family notice any trouble with your hearing that hasn't been managed with hearing aids?: No      Safety  Do you have working smoke detectors?: Yes  Do you have any tripping hazards - loose or unsecured carpets or rugs?: No  Do you have non-slip mats or non-slip surfaces or shower bars or grab bars 
Medicare Annual Wellness Visit    Joshua Briones Jr. is here for Medicare AWV and New Patient (Patient is here to establish care with provider. Former Prasanth patient./Patient hasn't able to get labs done.)    Assessment & Plan   Medicare annual wellness visit, subsequent     No follow-ups on file.     Subjective       Patient's complete Health Risk Assessment and screening values have been reviewed and are found in Flowsheets. The following problems were reviewed today and where indicated follow up appointments were made and/or referrals ordered.    Positive Risk Factor Screenings with Interventions:              Inactivity:  On average, how many days per week do you engage in moderate to strenuous exercise (like a brisk walk)?: 0 days (!) Abnormal  On average, how many minutes do you engage in exercise at this level?: 0 min  Interventions:  See AVS for additional education material    Poor Eating Habits/Diet:  Do you eat balanced/healthy meals regularly?: (!) No  Interventions:  See AVS for additional education material                        Objective   Vitals:    01/02/25 1436   Resp: 19   TempSrc: Temporal   Weight: 96.5 kg (212 lb 12.8 oz)   Height: 1.88 m (6' 2\")      Body mass index is 27.32 kg/m².                    Allergies   Allergen Reactions    Sulfa Antibiotics Other (See Comments)     Prior to Visit Medications    Medication Sig Taking? Authorizing Provider   losartan (COZAAR) 100 MG tablet Take 1 tablet by mouth daily Yes Alexandra Andrade MD   meloxicam (MOBIC) 15 MG tablet Take 1 tablet by mouth daily Yes Alexandra Andrade MD   sildenafil (VIAGRA) 100 MG tablet TAKE 1 TABLET BY MOUTH DAILY AS NEEDED Yes Alexandra Andrade MD   metoprolol succinate (TOPROL XL) 100 MG extended release tablet Take 2 tablets by mouth daily Yes Alexandra Andrade MD   warfarin (COUMADIN) 7.5 MG tablet Take 1 tablet by mouth daily Yes Alexandra Andrade MD   pantoprazole (PROTONIX) 40 MG tablet Take 1 tablet by mouth daily Yes 
Flomax    History of BCC-stable.  Follows with dermatology.    Return in about 6 months (around 7/2/2025).    Patient agrees with plan as above and has no additional questions at this time.     SUBJECTIVE/OBJECTIVE:    Patient presents for follow up chronic disease, EOC. This is a new patient to me, was previously following with Dr. Rader.  Histories have been reviewed and updated per chart below.    Patient feels well today.  No concerns or complaints.    HTN   Complications: none  Compliant with current medications, tolerating well.     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 DOAC too expensive    Acid reflux  On PPI   No breakthrough symptoms   No history of ulcer, GIB    BPH- on Flomax     Osteoarthritis- takes Meloxicam     History of basal cell carcinoma-follows with dermatology yearly.    HM/Social   Former smoker-quit years ago  Last colonoscopy on record 2013-patient states he called cardiology last year to schedule and they told him that he was not due for another 2 years.  I see no records of this.  Possible that he had his repeat colonoscopy in 2017 that was normal and then they recommended a 10-year follow-up?  I see nothing in Care Everywhere    Vitals:    01/02/25 1436   BP: 137/81   Site: Left Upper Arm   Position: Sitting   Cuff Size: Medium Adult   Pulse: 81   Resp: 19   Temp: 97.4 °F (36.3 °C)   TempSrc: Temporal   SpO2: 95%   Weight: 96.5 kg (212 lb 12.8 oz)   Height: 1.88 m (6' 2\")      Body mass index is 27.32 kg/m².     Gen: NAD, well appearing   Heart: RRR, no m/g/r  Lungs: CTA bilaterally, no wheezing, breathing comfortably  Psych: cooperative. Appropriate mood and affect.    I reviewed prior available labs.     Medical decision making complexity: moderate    I have discussed the diagnosis with the patient and the intended plan as seen in the above orders.  The patient has

## 2025-01-02 NOTE — PATIENT INSTRUCTIONS
Learning About Being Active as an Older Adult  Why is being active important as you get older?     Being active is one of the best things you can do for your health. And it's never too late to start. Being active--or getting active, if you aren't already--has definite benefits. It can:  Give you more energy,  Keep your mind sharp.  Improve balance to reduce your risk of falls.  Help you manage chronic illness with fewer medicines.  No matter how old you are, how fit you are, or what health problems you have, there is a form of activity that will work for you. And the more physical activity you can do, the better your overall health will be.  What kinds of activity can help you stay healthy?  Being more active will make your daily activities easier. Physical activity includes planned exercise and things you do in daily life. There are four types of activity:  Aerobic.  Doing aerobic activity makes your heart and lungs strong.  Includes walking, dancing, and gardening.  Aim for at least 2½ hours spread throughout the week.  It improves your energy and can help you sleep better.  Muscle-strengthening.  This type of activity can help maintain muscle and strengthen bones.  Includes climbing stairs, using resistance bands, and lifting or carrying heavy loads.  Aim for at least twice a week.  It can help protect the knees and other joints.  Stretching.  Stretching gives you better range of motion in joints and muscles.  Includes upper arm stretches, calf stretches, and gentle yoga.  Aim for at least twice a week, preferably after your muscles are warmed up from other activities.  It can help you function better in daily life.  Balancing.  This helps you stay coordinated and have good posture.  Includes heel-to-toe walking, hermann chi, and certain types of yoga.  Aim for at least 3 days a week.  It can reduce your risk of falling.  Even if you have a hard time meeting the recommendations, it's better to be more active

## 2025-02-03 ENCOUNTER — HOSPITAL ENCOUNTER (OUTPATIENT)
Age: 70
Discharge: HOME | End: 2025-02-06
Payer: MEDICARE

## 2025-02-03 DIAGNOSIS — Z79.01 CHRONIC ANTICOAGULATION: ICD-10-CM

## 2025-02-03 DIAGNOSIS — I10 ESSENTIAL HYPERTENSION: ICD-10-CM

## 2025-02-03 DIAGNOSIS — Z12.5 ENCOUNTER FOR PROSTATE CANCER SCREENING: ICD-10-CM

## 2025-02-03 LAB
ALBUMIN SERPL-MCNC: 3.5 G/DL (ref 3.4–5)
ALBUMIN/GLOB SERPL: 1.2 (ref 0.8–1.7)
ALP SERPL-CCNC: 83 U/L (ref 45–117)
ALT SERPL-CCNC: 22 U/L (ref 16–61)
ANION GAP SERPL CALC-SCNC: 3 MMOL/L (ref 3–18)
AST SERPL W P-5'-P-CCNC: 20 U/L (ref 10–38)
BASOPHILS # BLD: 0.04 K/UL (ref 0–0.1)
BASOPHILS NFR BLD: 0.7 % (ref 0–2)
BILIRUB SERPL-MCNC: 0.5 MG/DL (ref 0.2–1)
BUN SERPL-MCNC: 20 MG/DL (ref 7–18)
BUN/CREAT SERPL: 24 (ref 12–20)
CA-I BLD-MCNC: 8.3 MG/DL (ref 8.5–10.1)
CHLORIDE SERPL-SCNC: 109 MMOL/L (ref 100–111)
CHOLEST SERPL-MCNC: 154 MG/DL
CO2 SERPL-SCNC: 27 MMOL/L (ref 21–32)
CREAT SERPL-MCNC: 0.84 MG/DL (ref 0.6–1.3)
DIFFERENTIAL METHOD BLD: ABNORMAL
EOSINOPHIL # BLD: 0.19 K/UL (ref 0–0.4)
EOSINOPHIL NFR BLD: 3.4 % (ref 0–5)
ERYTHROCYTE [DISTWIDTH] IN BLOOD BY AUTOMATED COUNT: 12.5 % (ref 11.6–14.5)
GLOBULIN SER CALC-MCNC: 3 G/DL (ref 2–4)
GLUCOSE SERPL-MCNC: 93 MG/DL (ref 74–99)
HCT VFR BLD AUTO: 43.8 % (ref 36–48)
HDLC SERPL-MCNC: 44 MG/DL (ref 40–60)
HDLC SERPL: 3.5 (ref 0–5)
HGB BLD-MCNC: 14.8 G/DL (ref 13–16)
IMM GRANULOCYTES # BLD AUTO: 0.02 K/UL (ref 0–0.04)
IMM GRANULOCYTES NFR BLD AUTO: 0.4 % (ref 0–0.5)
INR PPP: 2.8 (ref 0.9–1.1)
LDLC SERPL CALC-MCNC: 87.4 MG/DL (ref 0–100)
LIPID PANEL: NORMAL
LYMPHOCYTES # BLD: 1.7 K/UL (ref 0.9–3.6)
LYMPHOCYTES NFR BLD: 30.7 % (ref 21–52)
MCH RBC QN AUTO: 32.3 PG (ref 24–34)
MCHC RBC AUTO-ENTMCNC: 33.8 G/DL (ref 31–37)
MCV RBC AUTO: 95.6 FL (ref 78–100)
MONOCYTES # BLD: 0.65 K/UL (ref 0.05–1.2)
MONOCYTES NFR BLD: 11.7 % (ref 3–10)
NEUTS SEG # BLD: 2.94 K/UL (ref 1.8–8)
NEUTS SEG NFR BLD: 53.1 % (ref 40–73)
NRBC # BLD: 0 K/UL (ref 0–0.01)
NRBC BLD-RTO: 0 PER 100 WBC
PLATELET # BLD AUTO: 188 K/UL (ref 135–420)
PMV BLD AUTO: 11.3 FL (ref 9.2–11.8)
POTASSIUM SERPL-SCNC: 3.7 MMOL/L (ref 3.5–5.5)
PROT SERPL-MCNC: 6.5 G/DL (ref 6.4–8.2)
PROTHROMBIN TIME: 29.8 SEC (ref 11.9–14.9)
PSA SERPL-MCNC: 0.9 NG/ML (ref 0–4)
RBC # BLD AUTO: 4.58 M/UL (ref 4.35–5.65)
SODIUM SERPL-SCNC: 139 MMOL/L (ref 136–145)
TRIGL SERPL-MCNC: 113 MG/DL
VLDLC SERPL CALC-MCNC: 22.6 MG/DL
WBC # BLD AUTO: 5.5 K/UL (ref 4.6–13.2)

## 2025-02-03 PROCEDURE — G0103 PSA SCREENING: HCPCS

## 2025-02-03 PROCEDURE — 80053 COMPREHEN METABOLIC PANEL: CPT

## 2025-02-03 PROCEDURE — 36415 COLL VENOUS BLD VENIPUNCTURE: CPT

## 2025-02-03 PROCEDURE — 85610 PROTHROMBIN TIME: CPT

## 2025-02-03 PROCEDURE — 80061 LIPID PANEL: CPT

## 2025-02-03 PROCEDURE — 85025 COMPLETE CBC W/AUTO DIFF WBC: CPT

## 2025-02-10 RX ORDER — SILDENAFIL 100 MG/1
100 TABLET, FILM COATED ORAL DAILY PRN
Qty: 4 TABLET | Refills: 1 | Status: SHIPPED | OUTPATIENT
Start: 2025-02-10

## 2025-02-24 RX ORDER — WARFARIN SODIUM 7.5 MG/1
7.5 TABLET ORAL DAILY
Qty: 90 TABLET | Refills: 1 | Status: SHIPPED | OUTPATIENT
Start: 2025-02-24

## 2025-02-24 RX ORDER — PANTOPRAZOLE SODIUM 40 MG/1
40 TABLET, DELAYED RELEASE ORAL DAILY
Qty: 90 TABLET | Refills: 1 | Status: SHIPPED | OUTPATIENT
Start: 2025-02-24

## 2025-02-27 RX ORDER — LOSARTAN POTASSIUM 100 MG/1
100 TABLET ORAL DAILY
Qty: 90 TABLET | Refills: 1 | Status: SHIPPED | OUTPATIENT
Start: 2025-02-27

## 2025-02-27 RX ORDER — MELOXICAM 15 MG/1
15 TABLET ORAL DAILY
Qty: 90 TABLET | Refills: 1 | Status: SHIPPED | OUTPATIENT
Start: 2025-02-27

## 2025-03-05 ENCOUNTER — HOSPITAL ENCOUNTER (OUTPATIENT)
Age: 70
Setting detail: SPECIMEN
Discharge: HOME | End: 2025-03-08
Payer: MEDICARE

## 2025-03-05 DIAGNOSIS — E83.119 HEMOCHROMATOSIS, UNSPECIFIED HEMOCHROMATOSIS TYPE: ICD-10-CM

## 2025-03-05 DIAGNOSIS — Z79.01 CHRONIC ANTICOAGULATION: ICD-10-CM

## 2025-03-05 LAB
BASOPHILS # BLD: 0.03 K/UL (ref 0–0.1)
BASOPHILS NFR BLD: 0.7 % (ref 0–2)
DIFFERENTIAL METHOD BLD: ABNORMAL
EOSINOPHIL # BLD: 0.11 K/UL (ref 0–0.4)
EOSINOPHIL NFR BLD: 2.5 % (ref 0–5)
ERYTHROCYTE [DISTWIDTH] IN BLOOD BY AUTOMATED COUNT: 12.3 % (ref 11.6–14.5)
FERRITIN SERPL-MCNC: 73 NG/ML (ref 8–388)
HCT VFR BLD AUTO: 46.3 % (ref 36–48)
HGB BLD-MCNC: 15.5 G/DL (ref 13–16)
IMM GRANULOCYTES # BLD AUTO: 0.02 K/UL (ref 0–0.04)
IMM GRANULOCYTES NFR BLD AUTO: 0.5 % (ref 0–0.5)
INR PPP: 2.2 (ref 0.9–1.1)
LYMPHOCYTES # BLD: 1.27 K/UL (ref 0.9–3.6)
LYMPHOCYTES NFR BLD: 29.2 % (ref 21–52)
MCH RBC QN AUTO: 32.1 PG (ref 24–34)
MCHC RBC AUTO-ENTMCNC: 33.5 G/DL (ref 31–37)
MCV RBC AUTO: 95.9 FL (ref 78–100)
MONOCYTES # BLD: 0.76 K/UL (ref 0.05–1.2)
MONOCYTES NFR BLD: 17.5 % (ref 3–10)
NEUTS SEG # BLD: 2.16 K/UL (ref 1.8–8)
NEUTS SEG NFR BLD: 49.6 % (ref 40–73)
NRBC # BLD: 0 K/UL (ref 0–0.01)
NRBC BLD-RTO: 0 PER 100 WBC
PLATELET # BLD AUTO: 163 K/UL (ref 135–420)
PMV BLD AUTO: 11.4 FL (ref 9.2–11.8)
PROTHROMBIN TIME: 24.4 SEC (ref 11.9–14.9)
RBC # BLD AUTO: 4.83 M/UL (ref 4.35–5.65)
WBC # BLD AUTO: 4.4 K/UL (ref 4.6–13.2)

## 2025-03-05 PROCEDURE — 36415 COLL VENOUS BLD VENIPUNCTURE: CPT

## 2025-03-05 PROCEDURE — 83550 IRON BINDING TEST: CPT

## 2025-03-05 PROCEDURE — 85025 COMPLETE CBC W/AUTO DIFF WBC: CPT

## 2025-03-05 PROCEDURE — 82728 ASSAY OF FERRITIN: CPT

## 2025-03-05 PROCEDURE — 85610 PROTHROMBIN TIME: CPT

## 2025-03-05 NOTE — TELEPHONE ENCOUNTER
Patient came in office needing his monthly INR lab to be put in. Can you place that order for the patient please... Patient states he has issues with this and has to come in every month is it possible to have this order placed for the next two months?   room air

## 2025-03-06 LAB — TIBC SERPL-MCNC: 312 UG/DL (ref 250–450)

## 2025-03-07 NOTE — TELEPHONE ENCOUNTER
Patient has been trying to get his medication refilled. Has been trying for three weeks now. Would like to have medication sent to [facility].

## 2025-03-11 RX ORDER — AMLODIPINE BESYLATE 5 MG/1
5 TABLET ORAL DAILY
Qty: 90 TABLET | Refills: 1 | Status: SHIPPED | OUTPATIENT
Start: 2025-03-11

## 2025-03-28 ENCOUNTER — HOSPITAL ENCOUNTER (OUTPATIENT)
Age: 70
Discharge: HOME | End: 2025-03-31
Payer: MEDICARE

## 2025-03-28 DIAGNOSIS — Z79.01 CHRONIC ANTICOAGULATION: ICD-10-CM

## 2025-03-28 LAB
INR PPP: 4.7 (ref 0.9–1.1)
PROTHROMBIN TIME: 44.4 SEC (ref 11.9–14.9)

## 2025-03-28 PROCEDURE — 36415 COLL VENOUS BLD VENIPUNCTURE: CPT

## 2025-03-28 PROCEDURE — 85610 PROTHROMBIN TIME: CPT

## 2025-03-31 ENCOUNTER — RESULTS FOLLOW-UP (OUTPATIENT)
Facility: CLINIC | Age: 70
End: 2025-03-31

## 2025-03-31 ENCOUNTER — HOSPITAL ENCOUNTER (OUTPATIENT)
Age: 70
Setting detail: SPECIMEN
Discharge: HOME | End: 2025-04-03
Payer: MEDICARE

## 2025-03-31 DIAGNOSIS — Z86.711 HISTORY OF PULMONARY EMBOLUS (PE): ICD-10-CM

## 2025-03-31 DIAGNOSIS — Z79.01 CHRONIC ANTICOAGULATION: ICD-10-CM

## 2025-03-31 DIAGNOSIS — Z86.718 HISTORY OF DVT (DEEP VEIN THROMBOSIS): Primary | ICD-10-CM

## 2025-03-31 LAB
INR PPP: 3 (ref 0.9–1.1)
PROTHROMBIN TIME: 31 SEC (ref 11.9–14.9)

## 2025-03-31 PROCEDURE — 36415 COLL VENOUS BLD VENIPUNCTURE: CPT

## 2025-03-31 PROCEDURE — 85610 PROTHROMBIN TIME: CPT

## 2025-04-04 ENCOUNTER — HOSPITAL ENCOUNTER (OUTPATIENT)
Age: 70
Discharge: HOME | End: 2025-04-04
Payer: MEDICARE

## 2025-04-04 DIAGNOSIS — Z79.01 CHRONIC ANTICOAGULATION: ICD-10-CM

## 2025-04-04 LAB
INR PPP: 1.2 (ref 0.9–1.1)
PROTHROMBIN TIME: 15.5 SEC (ref 11.9–14.9)

## 2025-04-04 PROCEDURE — 36415 COLL VENOUS BLD VENIPUNCTURE: CPT

## 2025-04-04 PROCEDURE — 85610 PROTHROMBIN TIME: CPT

## 2025-04-07 ENCOUNTER — RESULTS FOLLOW-UP (OUTPATIENT)
Facility: CLINIC | Age: 70
End: 2025-04-07

## 2025-04-07 ENCOUNTER — TELEMEDICINE (OUTPATIENT)
Dept: FAMILY MEDICINE CLINIC | Facility: CLINIC | Age: 70
End: 2025-04-07

## 2025-04-07 DIAGNOSIS — E55.9 VITAMIN D DEFICIENCY: ICD-10-CM

## 2025-04-07 DIAGNOSIS — Z86.718 HISTORY OF DVT (DEEP VEIN THROMBOSIS): Primary | ICD-10-CM

## 2025-04-07 DIAGNOSIS — I10 PRIMARY HYPERTENSION: ICD-10-CM

## 2025-04-07 DIAGNOSIS — E53.8 COBALAMIN DEFICIENCY: ICD-10-CM

## 2025-04-07 ASSESSMENT — PATIENT HEALTH QUESTIONNAIRE - PHQ9
SUM OF ALL RESPONSES TO PHQ QUESTIONS 1-9: 0
SUM OF ALL RESPONSES TO PHQ QUESTIONS 1-9: 0
2. FEELING DOWN, DEPRESSED OR HOPELESS: NOT AT ALL
SUM OF ALL RESPONSES TO PHQ QUESTIONS 1-9: 0
SUM OF ALL RESPONSES TO PHQ QUESTIONS 1-9: 0
1. LITTLE INTEREST OR PLEASURE IN DOING THINGS: NOT AT ALL

## 2025-04-07 NOTE — RESULT ENCOUNTER NOTE
Patient states he is changing providers and he will speak with New provider today regarding his INR.

## 2025-04-08 ENCOUNTER — HOSPITAL ENCOUNTER (OUTPATIENT)
Age: 70
Setting detail: SPECIMEN
Discharge: HOME | End: 2025-04-11
Payer: MEDICARE

## 2025-04-08 ENCOUNTER — RESULTS FOLLOW-UP (OUTPATIENT)
Dept: FAMILY MEDICINE CLINIC | Facility: CLINIC | Age: 70
End: 2025-04-08

## 2025-04-08 DIAGNOSIS — I10 PRIMARY HYPERTENSION: ICD-10-CM

## 2025-04-08 DIAGNOSIS — E55.9 VITAMIN D DEFICIENCY: ICD-10-CM

## 2025-04-08 DIAGNOSIS — Z86.718 HISTORY OF DVT (DEEP VEIN THROMBOSIS): ICD-10-CM

## 2025-04-08 DIAGNOSIS — E53.8 COBALAMIN DEFICIENCY: ICD-10-CM

## 2025-04-08 LAB
25(OH)D3 SERPL-MCNC: 31.9 NG/ML (ref 30–100)
INR PPP: 1.1 (ref 0.9–1.1)
PROTHROMBIN TIME: 14.2 SEC (ref 11.9–14.9)
TSH SERPL DL<=0.05 MIU/L-ACNC: 0.79 UIU/ML (ref 0.36–3.74)
VIT B12 SERPL-MCNC: 384 PG/ML (ref 211–911)

## 2025-04-08 PROCEDURE — 36415 COLL VENOUS BLD VENIPUNCTURE: CPT

## 2025-04-08 PROCEDURE — 82306 VITAMIN D 25 HYDROXY: CPT

## 2025-04-08 PROCEDURE — 85610 PROTHROMBIN TIME: CPT

## 2025-04-08 PROCEDURE — 84443 ASSAY THYROID STIM HORMONE: CPT

## 2025-04-08 PROCEDURE — 82607 VITAMIN B-12: CPT

## 2025-04-12 NOTE — PROGRESS NOTES
Joshua Briones Jr. presents today for   Chief Complaint   Patient presents with    New Patient         Depression Screenin/7/2025    10:21 AM 2025     3:00 PM 2024     3:57 AM 2023     7:57 AM 2023     6:46 AM 2023     9:56 AM 10/14/2022     8:59 AM   PHQ-9 Questionaire   Little interest or pleasure in doing things 0 0 0 0 0 0 0   Feeling down, depressed, or hopeless 0 0 0 0 0 0 0   PHQ-9 Total Score 0 0 0  0 0 0 0       Patient-reported       Fall Risk      2025    10:21 AM 2025     2:59 PM 2024     3:57 AM 2023     7:57 AM 2023     6:46 AM 2023     9:57 AM   Fall Risk   Do you feel unsteady or are you worried about falling?  no no no no no no   2 or more falls in past year? no no no no no no   Fall with injury in past year? no no no no no no         Health Maintenance reviewed and discussed and ordered per Provider.    Health Maintenance Due   Topic Date Due    DTaP/Tdap/Td vaccine (1 - Tdap) Never done    Pneumococcal 50+ years Vaccine (1 of 1 - PCV) Never done    Colorectal Cancer Screen  12/10/2018    Shingles vaccine (2 of 2) 2023    COVID-19 Vaccine ( season) 2024   .      \"Have you been to the ER, urgent care clinic since your last visit?  Hospitalized since your last visit?\"    NO    “Have you seen or consulted any other health care providers outside our system since your last visit?”    NO      “Have you had a colorectal cancer screening such as a colonoscopy/FIT/Cologuard?    NO - refused    Date of last Colonoscopy: 12/10/2013  No cologuard on file  No FIT/FOBT on file   No flexible sigmoidoscopy on file       Click Here for Release of Records Request     
advised him to contact the office if his condition worsens, changes or fails to improve as anticipated. He expressed understanding with the diagnosis(es) and plan

## 2025-04-15 ENCOUNTER — HOSPITAL ENCOUNTER (OUTPATIENT)
Age: 70
Discharge: HOME | End: 2025-04-18
Payer: MEDICARE

## 2025-04-15 ENCOUNTER — RESULTS FOLLOW-UP (OUTPATIENT)
Dept: FAMILY MEDICINE CLINIC | Facility: CLINIC | Age: 70
End: 2025-04-15

## 2025-04-15 DIAGNOSIS — Z86.718 HISTORY OF DVT (DEEP VEIN THROMBOSIS): ICD-10-CM

## 2025-04-15 LAB
INR PPP: 1.6 (ref 0.9–1.1)
PROTHROMBIN TIME: 19 SEC (ref 11.9–14.9)

## 2025-04-15 PROCEDURE — 36415 COLL VENOUS BLD VENIPUNCTURE: CPT

## 2025-04-15 PROCEDURE — 85610 PROTHROMBIN TIME: CPT

## 2025-04-15 RX ORDER — WARFARIN SODIUM 1 MG/1
0.5 TABLET ORAL DAILY
Qty: 30 TABLET | Refills: 0 | Status: SHIPPED | OUTPATIENT
Start: 2025-04-15 | End: 2025-05-12

## 2025-04-22 ENCOUNTER — HOSPITAL ENCOUNTER (OUTPATIENT)
Age: 70
Setting detail: SPECIMEN
Discharge: HOME | End: 2025-04-25
Payer: MEDICARE

## 2025-04-22 ENCOUNTER — RESULTS FOLLOW-UP (OUTPATIENT)
Dept: FAMILY MEDICINE CLINIC | Facility: CLINIC | Age: 70
End: 2025-04-22

## 2025-04-22 DIAGNOSIS — Z86.718 HISTORY OF DVT (DEEP VEIN THROMBOSIS): ICD-10-CM

## 2025-04-22 LAB
INR PPP: 2.2 (ref 0.9–1.1)
PROTHROMBIN TIME: 25 SEC (ref 11.9–14.9)

## 2025-04-22 PROCEDURE — 36415 COLL VENOUS BLD VENIPUNCTURE: CPT

## 2025-04-22 PROCEDURE — 85610 PROTHROMBIN TIME: CPT

## 2025-04-29 ENCOUNTER — HOSPITAL ENCOUNTER (OUTPATIENT)
Age: 70
Discharge: HOME | End: 2025-05-02
Payer: MEDICARE

## 2025-04-29 ENCOUNTER — RESULTS FOLLOW-UP (OUTPATIENT)
Dept: FAMILY MEDICINE CLINIC | Facility: CLINIC | Age: 70
End: 2025-04-29

## 2025-04-29 DIAGNOSIS — Z86.718 HISTORY OF DVT (DEEP VEIN THROMBOSIS): ICD-10-CM

## 2025-04-29 DIAGNOSIS — Z86.718 HISTORY OF DVT (DEEP VEIN THROMBOSIS): Primary | ICD-10-CM

## 2025-04-29 DIAGNOSIS — Z86.711 HISTORY OF PULMONARY EMBOLUS (PE): ICD-10-CM

## 2025-04-29 LAB
INR PPP: 3 (ref 0.9–1.1)
PROTHROMBIN TIME: 31.4 SEC (ref 11.9–14.9)

## 2025-04-29 PROCEDURE — 85610 PROTHROMBIN TIME: CPT

## 2025-04-29 PROCEDURE — 36415 COLL VENOUS BLD VENIPUNCTURE: CPT

## 2025-05-02 ENCOUNTER — HOSPITAL ENCOUNTER (OUTPATIENT)
Age: 70
Discharge: HOME | End: 2025-05-02
Payer: MEDICARE

## 2025-05-02 DIAGNOSIS — Z86.718 HISTORY OF DVT (DEEP VEIN THROMBOSIS): ICD-10-CM

## 2025-05-02 LAB
INR PPP: 3.1 (ref 0.9–1.1)
PROTHROMBIN TIME: 32.2 SEC (ref 11.9–14.9)

## 2025-05-02 PROCEDURE — 85610 PROTHROMBIN TIME: CPT

## 2025-05-02 PROCEDURE — 36415 COLL VENOUS BLD VENIPUNCTURE: CPT

## 2025-05-05 ENCOUNTER — HOSPITAL ENCOUNTER (OUTPATIENT)
Age: 70
Discharge: HOME | End: 2025-05-08
Payer: MEDICARE

## 2025-05-05 DIAGNOSIS — Z86.718 HISTORY OF DVT (DEEP VEIN THROMBOSIS): ICD-10-CM

## 2025-05-05 LAB
INR PPP: 2.6 (ref 0.9–1.1)
PROTHROMBIN TIME: 28.2 SEC (ref 11.9–14.9)

## 2025-05-05 PROCEDURE — 36415 COLL VENOUS BLD VENIPUNCTURE: CPT

## 2025-05-05 PROCEDURE — 85610 PROTHROMBIN TIME: CPT

## 2025-05-07 ENCOUNTER — OFFICE VISIT (OUTPATIENT)
Dept: FAMILY MEDICINE CLINIC | Facility: CLINIC | Age: 70
End: 2025-05-07
Payer: MEDICARE

## 2025-05-07 VITALS
HEART RATE: 80 BPM | OXYGEN SATURATION: 94 % | BODY MASS INDEX: 26 KG/M2 | HEIGHT: 74 IN | SYSTOLIC BLOOD PRESSURE: 113 MMHG | TEMPERATURE: 98.4 F | WEIGHT: 202.6 LBS | DIASTOLIC BLOOD PRESSURE: 70 MMHG

## 2025-05-07 DIAGNOSIS — Z86.711 HISTORY OF PULMONARY EMBOLUS (PE): ICD-10-CM

## 2025-05-07 DIAGNOSIS — I10 PRIMARY HYPERTENSION: Primary | ICD-10-CM

## 2025-05-07 DIAGNOSIS — Z86.718 HISTORY OF DVT (DEEP VEIN THROMBOSIS): ICD-10-CM

## 2025-05-07 PROCEDURE — 1160F RVW MEDS BY RX/DR IN RCRD: CPT

## 2025-05-07 PROCEDURE — 1159F MED LIST DOCD IN RCRD: CPT

## 2025-05-07 PROCEDURE — 3074F SYST BP LT 130 MM HG: CPT

## 2025-05-07 PROCEDURE — 99213 OFFICE O/P EST LOW 20 MIN: CPT

## 2025-05-07 PROCEDURE — 1123F ACP DISCUSS/DSCN MKR DOCD: CPT

## 2025-05-07 PROCEDURE — G8419 CALC BMI OUT NRM PARAM NOF/U: HCPCS

## 2025-05-07 PROCEDURE — 1036F TOBACCO NON-USER: CPT

## 2025-05-07 PROCEDURE — 3078F DIAST BP <80 MM HG: CPT

## 2025-05-07 PROCEDURE — 3017F COLORECTAL CA SCREEN DOC REV: CPT

## 2025-05-07 PROCEDURE — 1126F AMNT PAIN NOTED NONE PRSNT: CPT

## 2025-05-07 PROCEDURE — G8427 DOCREV CUR MEDS BY ELIG CLIN: HCPCS

## 2025-05-07 RX ORDER — WARFARIN SODIUM 5 MG/1
5 TABLET ORAL
COMMUNITY

## 2025-05-07 RX ORDER — HYDROCHLOROTHIAZIDE 12.5 MG/1
12.5 CAPSULE ORAL EVERY MORNING
Qty: 90 CAPSULE | Refills: 1 | Status: SHIPPED | OUTPATIENT
Start: 2025-05-07

## 2025-05-07 ASSESSMENT — ENCOUNTER SYMPTOMS: COUGH: 1

## 2025-05-07 ASSESSMENT — PATIENT HEALTH QUESTIONNAIRE - PHQ9
SUM OF ALL RESPONSES TO PHQ QUESTIONS 1-9: 0
1. LITTLE INTEREST OR PLEASURE IN DOING THINGS: NOT AT ALL
SUM OF ALL RESPONSES TO PHQ QUESTIONS 1-9: 0
2. FEELING DOWN, DEPRESSED OR HOPELESS: NOT AT ALL

## 2025-05-07 NOTE — PROGRESS NOTES
Joshua Briones . is a 69 y.o. male presents with   Chief Complaint   Patient presents with    Follow-up     1 month follow up for new patient.  Patient is concerned about right ankle swelling that has been going on for a few years      69-year-old male patient presents today for follow-up.  He has a past medical history of hypertension GERD PE x 3 he is on warfarin.  He was found recently in March to have a lung nodule however after seeing pulmonology this was determined to be infection, and has been on Levaquin x 2 along with Augmentin.  He follows back up with pulmonology in the next few days.  He tells me the plan is to see if his fever returns and if it is greater than 100.3 he will be notified pulmonology.  Today he does complain of his right ankle swelling he says this has been going on for some time he is on amlodipine we will stop that and added hydrochlorothiazide for blood pressure control and diuretic.  He denies any other complaints today.     Diagnosis   1. Primary hypertension       2. History of pulmonary embolus (PE)       3. History of DVT (deep vein thrombosis)         /70   Pulse 80   Temp 98.4 °F (36.9 °C) (Temporal)   Ht 1.88 m (6' 2\")   Wt 91.9 kg (202 lb 9.6 oz)   SpO2 94%   BMI 26.01 kg/m²   Subjective:     Past Medical History:   Diagnosis Date    Hemochromatosis     Hypertension     Osteoarthritis     Thromboembolus (HCC) 1996    left leg / x3 times     Past Surgical History:   Procedure Laterality Date    ARM SURGERY Right 1975    BACK SURGERY      x2    ELBOW SURGERY Bilateral     JOINT REPLACEMENT      OTHER SURGICAL HISTORY      SINUS X4    ROTATOR CUFF REPAIR Right     TOTAL KNEE ARTHROPLASTY Bilateral      Social History     Socioeconomic History    Marital status:      Spouse name: None    Number of children: None    Years of education: None    Highest education level: None   Tobacco Use    Smoking status: Former     Current packs/day: 0.00     Average

## 2025-05-07 NOTE — PROGRESS NOTES
Joshua Briones Jr. presents today for   Chief Complaint   Patient presents with    Follow-up     1 month follow up for new patient.  Patient is concerned about right ankle swelling that has been going on for a few years        Is someone accompanying this pt? No     Is the patient using any DME equipment during OV? No     Depression Screenin/7/2025     1:17 PM 2025    10:21 AM 2025     3:00 PM 2024     3:57 AM 2023     7:57 AM 2023     6:46 AM 2023     9:56 AM   PHQ-9 Questionaire   Little interest or pleasure in doing things 0 0 0 0 0 0 0   Feeling down, depressed, or hopeless 0 0 0 0 0 0 0   PHQ-9 Total Score 0 0 0 0  0 0 0       Patient-reported       Fall Risk      2025     1:17 PM 2025    10:21 AM 2025     2:59 PM 2024     3:57 AM 2023     7:57 AM 2023     6:46 AM 2023     9:57 AM   Fall Risk   Do you feel unsteady or are you worried about falling?  no no no no no no no   2 or more falls in past year? no no no no no no no   Fall with injury in past year? no no no no no no no        Health Maintenance reviewed and discussed and ordered per Provider.    Health Maintenance Due   Topic Date Due    DTaP/Tdap/Td vaccine (1 - Tdap) Never done    Pneumococcal 50+ years Vaccine (1 of 1 - PCV) Never done    Colorectal Cancer Screen  12/10/2018    Shingles vaccine (2 of 2) 2023    COVID-19 Vaccine ( season) 2024   .        \"Have you been to the ER, urgent care clinic since your last visit?  Hospitalized since your last visit?\"    NO    “Have you seen or consulted any other health care providers outside of [facility] since your last visit?”    NO    “Have you had a colorectal cancer screening such as a colonoscopy/FIT/Cologuard?    Most recent on file and patient does not want another one     Date of last Colonoscopy: 12/10/2013  No cologuard on file  No FIT/FOBT on file   No flexible sigmoidoscopy on file

## 2025-05-09 ENCOUNTER — TELEPHONE (OUTPATIENT)
Dept: FAMILY MEDICINE CLINIC | Facility: CLINIC | Age: 70
End: 2025-05-09

## 2025-05-12 ENCOUNTER — HOSPITAL ENCOUNTER (OUTPATIENT)
Age: 70
Discharge: HOME | End: 2025-05-15
Payer: MEDICARE

## 2025-05-12 DIAGNOSIS — Z86.718 HISTORY OF DVT (DEEP VEIN THROMBOSIS): ICD-10-CM

## 2025-05-12 LAB
INR PPP: 2.2 (ref 0.9–1.1)
PROTHROMBIN TIME: 24.9 SEC (ref 11.9–14.9)

## 2025-05-12 PROCEDURE — 85610 PROTHROMBIN TIME: CPT

## 2025-05-12 PROCEDURE — 36415 COLL VENOUS BLD VENIPUNCTURE: CPT

## 2025-05-12 RX ORDER — WARFARIN SODIUM 2 MG/1
TABLET ORAL
Qty: 30 TABLET | Refills: 3 | Status: SHIPPED | OUTPATIENT
Start: 2025-05-12

## 2025-05-12 RX ORDER — WARFARIN SODIUM 5 MG/1
TABLET ORAL
Qty: 30 TABLET | Refills: 3 | Status: SHIPPED | OUTPATIENT
Start: 2025-05-12

## 2025-05-15 RX ORDER — SILDENAFIL 100 MG/1
100 TABLET, FILM COATED ORAL DAILY PRN
Qty: 4 TABLET | Refills: 1 | Status: SHIPPED | OUTPATIENT
Start: 2025-05-15

## 2025-05-19 RX ORDER — SILDENAFIL 100 MG/1
100 TABLET, FILM COATED ORAL DAILY PRN
Qty: 4 TABLET | Refills: 1 | Status: SHIPPED | OUTPATIENT
Start: 2025-05-19

## 2025-05-23 RX ORDER — PANTOPRAZOLE SODIUM 40 MG/1
40 TABLET, DELAYED RELEASE ORAL DAILY
Qty: 90 TABLET | Refills: 1 | Status: SHIPPED | OUTPATIENT
Start: 2025-05-23

## 2025-05-27 ENCOUNTER — HOSPITAL ENCOUNTER (OUTPATIENT)
Age: 70
Discharge: HOME | End: 2025-05-30
Payer: MEDICARE

## 2025-05-27 DIAGNOSIS — Z86.718 HISTORY OF DVT (DEEP VEIN THROMBOSIS): ICD-10-CM

## 2025-05-27 LAB
INR PPP: 4.3 (ref 0.9–1.1)
PROTHROMBIN TIME: 41.8 SEC (ref 11.9–14.9)

## 2025-05-27 PROCEDURE — 36415 COLL VENOUS BLD VENIPUNCTURE: CPT

## 2025-05-27 PROCEDURE — 85610 PROTHROMBIN TIME: CPT

## 2025-05-30 RX ORDER — METOPROLOL SUCCINATE 100 MG/1
200 TABLET, EXTENDED RELEASE ORAL DAILY
Qty: 360 TABLET | Refills: 0 | OUTPATIENT
Start: 2025-05-30

## 2025-06-09 ENCOUNTER — HOSPITAL ENCOUNTER (OUTPATIENT)
Age: 70
Discharge: HOME | End: 2025-06-12
Payer: MEDICARE

## 2025-06-09 ENCOUNTER — OFFICE VISIT (OUTPATIENT)
Dept: FAMILY MEDICINE CLINIC | Facility: CLINIC | Age: 70
End: 2025-06-09

## 2025-06-09 VITALS
BODY MASS INDEX: 25.01 KG/M2 | SYSTOLIC BLOOD PRESSURE: 128 MMHG | HEIGHT: 74 IN | OXYGEN SATURATION: 97 % | WEIGHT: 194.9 LBS | DIASTOLIC BLOOD PRESSURE: 79 MMHG | TEMPERATURE: 97.8 F | HEART RATE: 66 BPM

## 2025-06-09 DIAGNOSIS — Z09 HOSPITAL DISCHARGE FOLLOW-UP: Primary | ICD-10-CM

## 2025-06-09 DIAGNOSIS — Z86.718 HISTORY OF DVT (DEEP VEIN THROMBOSIS): ICD-10-CM

## 2025-06-09 DIAGNOSIS — Z86.711 HISTORY OF PULMONARY EMBOLUS (PE): ICD-10-CM

## 2025-06-09 LAB
INR PPP: 1.3 (ref 0.9–1.1)
PROTHROMBIN TIME: 16.9 SEC (ref 11.9–14.9)

## 2025-06-09 PROCEDURE — 85610 PROTHROMBIN TIME: CPT

## 2025-06-09 PROCEDURE — 36415 COLL VENOUS BLD VENIPUNCTURE: CPT

## 2025-06-09 RX ORDER — LINEZOLID 600 MG/1
600 TABLET, FILM COATED ORAL EVERY 12 HOURS
COMMUNITY
Start: 2025-06-03 | End: 2025-06-10

## 2025-06-09 RX ORDER — WARFARIN SODIUM 5 MG/1
TABLET ORAL
Qty: 30 TABLET | Refills: 3 | Status: SHIPPED | OUTPATIENT
Start: 2025-06-09

## 2025-06-09 RX ORDER — WARFARIN SODIUM 2 MG/1
TABLET ORAL
Qty: 30 TABLET | Refills: 3 | Status: SHIPPED | OUTPATIENT
Start: 2025-06-09

## 2025-06-11 NOTE — PROGRESS NOTES
Joshua Briones Jr. presents today for   Chief Complaint   Patient presents with    Follow-Up from Hospital     Hospital follow up for empyema        Is someone accompanying this pt? No     Is the patient using any DME equipment during OV? No     Depression Screenin/9/2025     3:29 PM 2025     1:17 PM 2025    10:21 AM 2025     3:00 PM 2024     3:57 AM 2023     7:57 AM 2023     6:46 AM   PHQ-9 Questionaire   Little interest or pleasure in doing things 0 0 0 0 0 0 0   Feeling down, depressed, or hopeless 0 0 0 0 0 0 0   PHQ-9 Total Score 0 0 0 0 0  0 0       Patient-reported       Fall Risk      2025     3:29 PM 2025     1:17 PM 2025    10:21 AM 2025     2:59 PM 2024     3:57 AM 2023     7:57 AM 2023     6:46 AM   Fall Risk   Do you feel unsteady or are you worried about falling?  no no no no no no no   2 or more falls in past year? no no no no no no no   Fall with injury in past year? no no no no no no no        Health Maintenance reviewed and discussed and ordered per Provider.    Health Maintenance Due   Topic Date Due    DTaP/Tdap/Td vaccine (1 - Tdap) Never done    Pneumococcal 50+ years Vaccine (1 of 1 - PCV) Never done    Colorectal Cancer Screen  12/10/2018    Shingles vaccine (2 of 2) 2023    COVID-19 Vaccine (4 -  season) 2024   .        \"Have you been to the ER, urgent care clinic since your last visit?  Hospitalized since your last visit?\"    YES - When: approximately 2  weeks ago.  Where and Why[facility]    “Have you seen or consulted any other health care providers outside of [facility] since your last visit?”    NO    “Have you had a colorectal cancer screening such as a colonoscopy/FIT/Cologuard?    Most recent on file     Date of last Colonoscopy: 12/10/2013  No cologuard on file  No FIT/FOBT on file   No flexible sigmoidoscopy on file            
morning and evening) 180 tablet 1    cyanocobalamin 1000 MCG tablet Take 1 tablet by mouth daily          Medications patient taking as of now reconciled against medications ordered at time of hospital discharge: Yes    A comprehensive review of systems was negative except for what was noted in the HPI.    Objective:    /79   Pulse 66   Temp 97.8 °F (36.6 °C) (Temporal)   Ht 1.88 m (6' 2\")   Wt 88.4 kg (194 lb 14.4 oz)   SpO2 97%   BMI 25.02 kg/m²       An electronic signature was used to authenticate this note.  --RICARDO Niño - NP

## 2025-06-12 RX ORDER — WARFARIN SODIUM 1 MG/1
TABLET ORAL
Qty: 30 TABLET | Refills: 0 | Status: SHIPPED | OUTPATIENT
Start: 2025-06-12

## 2025-06-16 ENCOUNTER — HOSPITAL ENCOUNTER (OUTPATIENT)
Age: 70
Discharge: HOME | End: 2025-06-19
Payer: MEDICARE

## 2025-06-16 ENCOUNTER — RESULTS FOLLOW-UP (OUTPATIENT)
Dept: FAMILY MEDICINE CLINIC | Facility: CLINIC | Age: 70
End: 2025-06-16

## 2025-06-16 DIAGNOSIS — Z86.718 HISTORY OF DVT (DEEP VEIN THROMBOSIS): ICD-10-CM

## 2025-06-16 LAB
INR PPP: 2.4 (ref 0.9–1.1)
PROTHROMBIN TIME: 26.1 SEC (ref 11.9–14.9)

## 2025-06-16 PROCEDURE — 36415 COLL VENOUS BLD VENIPUNCTURE: CPT

## 2025-06-16 PROCEDURE — 85610 PROTHROMBIN TIME: CPT

## 2025-06-30 ENCOUNTER — TELEPHONE (OUTPATIENT)
Dept: FAMILY MEDICINE CLINIC | Facility: CLINIC | Age: 70
End: 2025-06-30

## 2025-06-30 RX ORDER — METOPROLOL TARTRATE 50 MG
50 TABLET ORAL 2 TIMES DAILY
Qty: 180 TABLET | Refills: 0 | Status: SHIPPED | OUTPATIENT
Start: 2025-06-30

## 2025-06-30 NOTE — TELEPHONE ENCOUNTER
Patient said his pulse at the pulm visit was 42. Dr. Grace's note is in the chart and states :  \"Bradycardia Decrease metoprolol to 50 mg BID from 100 mg BID\"   Patient has metoprolol succinate 100mg tablets, so he is asking if he needs to be cutting them in half or if he needs a new prescription for the 50mg tablets. He wants to know what you suggest?   Dr. Mack was the original prescriber, so Dr. Grace wanted patient's PCP to continue to fill this medication.

## 2025-06-30 NOTE — TELEPHONE ENCOUNTER
Patient came by stating that his pulmonologist wants you to decrease his metoprolol due to his low heart rate.     Patient sees Dr. Grace      Can you please get nurse to advise patient what to do from here? Thank you.

## 2025-06-30 NOTE — TELEPHONE ENCOUNTER
Spoke with patient. Patient is aware and voiced understanding.  I also confirmed with anushka husain that it is BID. Rx sent

## 2025-07-01 ENCOUNTER — RESULTS FOLLOW-UP (OUTPATIENT)
Dept: FAMILY MEDICINE CLINIC | Facility: CLINIC | Age: 70
End: 2025-07-01

## 2025-07-01 ENCOUNTER — HOSPITAL ENCOUNTER (OUTPATIENT)
Age: 70
Discharge: HOME | End: 2025-07-04
Payer: MEDICARE

## 2025-07-01 DIAGNOSIS — Z86.711 HISTORY OF PULMONARY EMBOLUS (PE): ICD-10-CM

## 2025-07-01 DIAGNOSIS — Z86.718 HISTORY OF DVT (DEEP VEIN THROMBOSIS): Primary | ICD-10-CM

## 2025-07-01 DIAGNOSIS — Z86.718 HISTORY OF DVT (DEEP VEIN THROMBOSIS): ICD-10-CM

## 2025-07-01 LAB
INR PPP: 2.2 (ref 0.9–1.2)
PROTHROMBIN TIME: 25 SEC (ref 12–15.1)

## 2025-07-01 PROCEDURE — 36415 COLL VENOUS BLD VENIPUNCTURE: CPT

## 2025-07-01 PROCEDURE — 85610 PROTHROMBIN TIME: CPT

## 2025-07-29 ENCOUNTER — HOSPITAL ENCOUNTER (OUTPATIENT)
Age: 70
Setting detail: SPECIMEN
Discharge: HOME | End: 2025-08-01
Payer: MEDICARE

## 2025-07-29 DIAGNOSIS — Z86.718 HISTORY OF DVT (DEEP VEIN THROMBOSIS): ICD-10-CM

## 2025-07-29 DIAGNOSIS — Z86.711 HISTORY OF PULMONARY EMBOLUS (PE): ICD-10-CM

## 2025-07-29 LAB
INR PPP: 2.4 (ref 0.9–1.2)
PROTHROMBIN TIME: 26.7 SEC (ref 12–15.1)

## 2025-07-29 PROCEDURE — 85610 PROTHROMBIN TIME: CPT

## 2025-07-29 PROCEDURE — 36415 COLL VENOUS BLD VENIPUNCTURE: CPT

## 2025-08-25 RX ORDER — MELOXICAM 15 MG/1
15 TABLET ORAL DAILY
Qty: 90 TABLET | Refills: 1 | Status: SHIPPED | OUTPATIENT
Start: 2025-08-25

## 2025-08-26 DIAGNOSIS — Z13.29 THYROID DISORDER SCREEN: ICD-10-CM

## 2025-08-26 DIAGNOSIS — Z86.718 HISTORY OF DVT (DEEP VEIN THROMBOSIS): ICD-10-CM

## 2025-08-26 DIAGNOSIS — Z13.220 LIPID SCREENING: ICD-10-CM

## 2025-08-26 DIAGNOSIS — I10 PRIMARY HYPERTENSION: ICD-10-CM

## 2025-08-26 DIAGNOSIS — Z86.718 HISTORY OF DVT (DEEP VEIN THROMBOSIS): Primary | ICD-10-CM

## 2025-09-02 ENCOUNTER — HOSPITAL ENCOUNTER (OUTPATIENT)
Age: 70
Discharge: HOME | End: 2025-09-05
Payer: MEDICARE

## 2025-09-02 LAB
ALBUMIN SERPL-MCNC: 3.6 G/DL (ref 3.4–5)
ALBUMIN/GLOB SERPL: 1.2
ALP SERPL-CCNC: 70 U/L (ref 45–117)
ALT SERPL-CCNC: 10 U/L (ref 10–50)
ANION GAP SERPL CALC-SCNC: 8 MMOL/L (ref 3–18)
AST SERPL W P-5'-P-CCNC: 24 U/L (ref 10–38)
BILIRUB SERPL-MCNC: 0.6 MG/DL (ref 0.2–1)
BUN SERPL-MCNC: 16 MG/DL (ref 6–23)
BUN/CREAT SERPL: 21
CA-I BLD-MCNC: 9 MG/DL (ref 8.5–10.1)
CHLORIDE SERPL-SCNC: 104 MMOL/L (ref 98–107)
CHOLEST SERPL-MCNC: 175 MG/DL
CO2 SERPL-SCNC: 28 MMOL/L (ref 21–32)
CREAT SERPL-MCNC: 0.76 MG/DL (ref 0.6–1.3)
ERYTHROCYTE [DISTWIDTH] IN BLOOD BY AUTOMATED COUNT: 15.1 % (ref 11.6–14.5)
GLOBULIN SER CALC-MCNC: 2.9 G/DL
GLUCOSE SERPL-MCNC: 96 MG/DL (ref 74–108)
HCT VFR BLD AUTO: 42.2 % (ref 36–48)
HDLC SERPL-MCNC: 50 MG/DL (ref 40–60)
HDLC SERPL: 3.5 (ref 0–5)
HGB BLD-MCNC: 14.9 G/DL (ref 13–16)
INR PPP: 1.9 (ref 0.9–1.2)
LDLC SERPL CALC-MCNC: 113 MG/DL (ref 0–100)
MCH RBC QN AUTO: 32.5 PG (ref 24–34)
MCHC RBC AUTO-ENTMCNC: 35.3 G/DL (ref 31–37)
MCV RBC AUTO: 92.1 FL (ref 78–100)
NRBC # BLD: 0 K/UL (ref 0–0.01)
NRBC BLD-RTO: 0 PER 100 WBC
PLATELET # BLD AUTO: 162 K/UL (ref 135–420)
PMV BLD AUTO: 11.3 FL (ref 9.2–11.8)
POTASSIUM SERPL-SCNC: 3.9 MMOL/L (ref 3.5–5.5)
PROT SERPL-MCNC: 6.5 G/DL (ref 6.4–8.2)
PROTHROMBIN TIME: 22.5 SEC (ref 12–15.1)
RBC # BLD AUTO: 4.58 M/UL (ref 4.35–5.65)
SODIUM SERPL-SCNC: 141 MMOL/L (ref 136–145)
TRIGL SERPL-MCNC: 63 MG/DL (ref 0–150)
TSH, 3RD GENERATION: 1.24 UIU/ML (ref 0.27–4.2)
VLDLC SERPL CALC-MCNC: 13 MG/DL
WBC # BLD AUTO: 5.2 K/UL (ref 4.6–13.2)

## 2025-09-02 PROCEDURE — 80061 LIPID PANEL: CPT

## 2025-09-02 PROCEDURE — 85027 COMPLETE CBC AUTOMATED: CPT

## 2025-09-02 PROCEDURE — 85610 PROTHROMBIN TIME: CPT

## 2025-09-02 PROCEDURE — 80053 COMPREHEN METABOLIC PANEL: CPT

## 2025-09-02 PROCEDURE — 84443 ASSAY THYROID STIM HORMONE: CPT

## 2025-09-04 RX ORDER — LOSARTAN POTASSIUM 100 MG/1
100 TABLET ORAL DAILY
Qty: 90 TABLET | Refills: 1 | Status: SHIPPED | OUTPATIENT
Start: 2025-09-04

## (undated) DEVICE — SOL IRR STRL H2O 500ML STRL --

## (undated) DEVICE — NDL CNTR 40CT FM MAG: Brand: MEDLINE INDUSTRIES, INC.

## (undated) DEVICE — GLOVE ORANGE PI 8   MSG9080

## (undated) DEVICE — GOWN,PREVENTION PLUS,XL,ST,24/CS: Brand: MEDLINE

## (undated) DEVICE — SKIN MARKER,REGULAR TIP WITH RULER: Brand: DEVON

## (undated) DEVICE — GLOVE SURG SZ 65 THK91MIL LTX FREE SYN POLYISOPRENE

## (undated) DEVICE — GLOVE ORANGE PI 7 1/2   MSG9075

## (undated) DEVICE — GLOVE ORANGE PI 7   MSG9070